# Patient Record
Sex: MALE | Race: WHITE
[De-identification: names, ages, dates, MRNs, and addresses within clinical notes are randomized per-mention and may not be internally consistent; named-entity substitution may affect disease eponyms.]

---

## 2017-04-21 ENCOUNTER — HOSPITAL ENCOUNTER (OUTPATIENT)
Dept: HOSPITAL 53 - M RAD | Age: 67
End: 2017-04-21
Attending: NURSE PRACTITIONER
Payer: MEDICARE

## 2017-04-21 DIAGNOSIS — K76.9: Primary | ICD-10-CM

## 2017-04-21 DIAGNOSIS — R10.11: ICD-10-CM

## 2017-04-21 NOTE — REP
Right upper quadrant sonography:

 

History:  Right upper quadrant pain, discomfort.

 

Comparison study:  February 6, 2014.

 

Findings:  Scanning through the right upper quadrant of the abdomen demonstrates

a borderline size liver measuring 17 cm in craniocaudal span in the midclavicular

line.  There is increased echogenicity diffusely in the liver with decreased

insonation consistent with diffuse fatty infiltration.  This is unchanged.

Common bile duct is normal measuring 0.4 cm in greatest diameter.  No gallstone,

polyp or mural thickening is seen.  The tail of the pancreas is partially

obscured by abdominal gas.  No pancreatic abnormality is seen.  No right renal

abnormality or ascites seen.  Right kidney measures 10.7 x 4.0 x 5.0 cm.

 

Impression:

 

Fatty infiltration of the liver.  Borderline liver size.  No other abnormality.

 

 

Signed by

Mehran Faria MD 04/21/2017 12:47 P

## 2017-11-08 ENCOUNTER — HOSPITAL ENCOUNTER (OUTPATIENT)
Dept: HOSPITAL 53 - M SFHCPLAZ | Age: 67
End: 2017-11-08
Attending: NURSE PRACTITIONER
Payer: MEDICARE

## 2017-11-08 DIAGNOSIS — Z00.00: Primary | ICD-10-CM

## 2017-11-08 DIAGNOSIS — I10: ICD-10-CM

## 2017-11-08 DIAGNOSIS — Z23: ICD-10-CM

## 2017-11-08 DIAGNOSIS — I05.9: ICD-10-CM

## 2017-11-08 DIAGNOSIS — D36.9: ICD-10-CM

## 2017-11-08 DIAGNOSIS — Z87.891: ICD-10-CM

## 2017-11-08 DIAGNOSIS — Z12.5: ICD-10-CM

## 2017-11-08 DIAGNOSIS — J30.2: ICD-10-CM

## 2017-11-08 DIAGNOSIS — K76.0: ICD-10-CM

## 2017-11-08 DIAGNOSIS — R74.8: ICD-10-CM

## 2017-11-08 DIAGNOSIS — R73.01: ICD-10-CM

## 2017-11-08 LAB
ALBUMIN SERPL BCG-MCNC: 3.8 GM/DL (ref 3.2–5.2)
ALBUMIN/GLOB SERPL: 1.19 {RATIO} (ref 1–1.93)
ALP SERPL-CCNC: 71 U/L (ref 45–117)
ALT SERPL W P-5'-P-CCNC: 37 U/L (ref 12–78)
ANION GAP SERPL CALC-SCNC: 10 MEQ/L (ref 8–16)
AST SERPL-CCNC: 16 U/L (ref 7–37)
BILIRUB SERPL-MCNC: 0.3 MG/DL (ref 0.2–1)
BUN SERPL-MCNC: 13 MG/DL (ref 7–18)
CALCIUM SERPL-MCNC: 9.2 MG/DL (ref 8.8–10.2)
CHLORIDE SERPL-SCNC: 106 MEQ/L (ref 98–107)
CHOLEST SERPL-MCNC: 205 MG/DL (ref ?–200)
CO2 SERPL-SCNC: 24 MEQ/L (ref 21–32)
CREAT SERPL-MCNC: 1.06 MG/DL (ref 0.7–1.3)
ERYTHROCYTE [DISTWIDTH] IN BLOOD BY AUTOMATED COUNT: 13.4 % (ref 11.5–14.5)
EST. AVERAGE GLUCOSE BLD GHB EST-MCNC: 126 MG/DL (ref 60–110)
GFR SERPL CREATININE-BSD FRML MDRD: > 60 ML/MIN/{1.73_M2} (ref 49–?)
GLUCOSE SERPL-MCNC: 109 MG/DL (ref 80–110)
MCH RBC QN AUTO: 29.2 PG (ref 27–33)
MCHC RBC AUTO-ENTMCNC: 33.6 G/DL (ref 32–36.5)
MCV RBC AUTO: 87 FL (ref 80–96)
NRBC BLD AUTO-RTO: 0 % (ref 0–0)
PLATELET # BLD AUTO: 259 10^3/UL (ref 150–450)
POTASSIUM SERPL-SCNC: 4.5 MEQ/L (ref 3.5–5.1)
PROT SERPL-MCNC: 7 GM/DL (ref 6.4–8.2)
SODIUM SERPL-SCNC: 140 MEQ/L (ref 136–145)
TRIGL SERPL-MCNC: 144 MG/DL (ref ?–150)
WBC # BLD AUTO: 6.3 10^3/UL (ref 4–10)

## 2017-11-08 PROCEDURE — 36415 COLL VENOUS BLD VENIPUNCTURE: CPT

## 2017-11-08 PROCEDURE — 85027 COMPLETE CBC AUTOMATED: CPT

## 2017-11-08 PROCEDURE — 80061 LIPID PANEL: CPT

## 2017-11-08 PROCEDURE — 83036 HEMOGLOBIN GLYCOSYLATED A1C: CPT

## 2017-11-08 PROCEDURE — 90662 IIV NO PRSV INCREASED AG IM: CPT

## 2017-11-08 PROCEDURE — 90732 PPSV23 VACC 2 YRS+ SUBQ/IM: CPT

## 2017-11-08 PROCEDURE — 80053 COMPREHEN METABOLIC PANEL: CPT

## 2018-11-30 ENCOUNTER — HOSPITAL ENCOUNTER (OUTPATIENT)
Dept: HOSPITAL 53 - M SFHCPLAZ | Age: 68
End: 2018-11-30
Attending: NURSE PRACTITIONER
Payer: MEDICARE

## 2018-11-30 DIAGNOSIS — Z12.5: ICD-10-CM

## 2018-11-30 DIAGNOSIS — R73.01: Primary | ICD-10-CM

## 2018-11-30 LAB
ALBUMIN/GLOBULIN RATIO: 1.09 (ref 1–1.93)
ALBUMIN: 3.6 GM/DL (ref 3.2–5.2)
ALKALINE PHOSPHATASE: 64 U/L (ref 45–117)
ALT SERPL W P-5'-P-CCNC: 36 U/L (ref 12–78)
ANION GAP: 9 MEQ/L (ref 8–16)
AST SERPL-CCNC: 17 U/L (ref 7–37)
BILIRUBIN,TOTAL: 0.4 MG/DL (ref 0.2–1)
BLOOD UREA NITROGEN: 14 MG/DL (ref 7–18)
CALCIUM LEVEL: 8.3 MG/DL (ref 8.8–10.2)
CARBON DIOXIDE LEVEL: 26 MEQ/L (ref 21–32)
CHLORIDE LEVEL: 108 MEQ/L (ref 98–107)
CHOLEST SERPL-MCNC: 181 MG/DL (ref ?–200)
CHOLESTEROL RISK RATIO: 3.42 (ref ?–5)
CREATININE FOR GFR: 1.09 MG/DL (ref 0.7–1.3)
EST. AVERAGE GLUCOSE BLD GHB EST-MCNC: 131 MG/DL (ref 60–110)
GFR SERPL CREATININE-BSD FRML MDRD: > 60 ML/MIN/{1.73_M2} (ref 49–?)
GLUCOSE, FASTING: 115 MG/DL (ref 70–100)
HDLC SERPL-MCNC: 53 MG/DL (ref 40–?)
HEMATOCRIT: 49 % (ref 42–52)
HEMOGLOBIN: 16.2 G/DL (ref 13.5–17.5)
LDL CHOLESTEROL: 103 MG/DL (ref ?–100)
MEAN CORPUSCULAR HEMOGLOBIN: 29.1 PG (ref 27–33)
MEAN CORPUSCULAR HGB CONC: 33.1 G/DL (ref 32–36.5)
MEAN CORPUSCULAR VOLUME: 88 FL (ref 80–96)
NONHDLC SERPL-MCNC: 128 MG/DL
NRBC BLD AUTO-RTO: 0 % (ref 0–0)
PLATELET COUNT, AUTOMATED: 240 10^3/UL (ref 150–450)
POTASSIUM SERUM: 4.6 MEQ/L (ref 3.5–5.1)
PSA SERPL-MCNC: 1.8 NG/ML (ref ?–4)
RED BLOOD COUNT: 5.57 10^6/UL (ref 4.3–6.1)
RED CELL DISTRIBUTION WIDTH: 13.5 % (ref 11.5–14.5)
SODIUM LEVEL: 143 MEQ/L (ref 136–145)
TOTAL PROTEIN: 6.9 GM/DL (ref 6.4–8.2)
TRIGLYCERIDES LEVEL: 124 MG/DL (ref ?–150)
WHITE BLOOD COUNT: 5.9 10^3/UL (ref 4–10)

## 2018-11-30 PROCEDURE — 80053 COMPREHEN METABOLIC PANEL: CPT

## 2019-11-18 ENCOUNTER — HOSPITAL ENCOUNTER (OUTPATIENT)
Dept: HOSPITAL 53 - M OPP | Age: 69
Discharge: HOME | End: 2019-11-18
Attending: INTERNAL MEDICINE
Payer: MEDICARE

## 2019-11-18 VITALS — WEIGHT: 241 LBS | BODY MASS INDEX: 35.7 KG/M2 | HEIGHT: 69 IN

## 2019-11-18 VITALS — SYSTOLIC BLOOD PRESSURE: 127 MMHG | DIASTOLIC BLOOD PRESSURE: 82 MMHG

## 2019-11-18 DIAGNOSIS — Z79.82: ICD-10-CM

## 2019-11-18 DIAGNOSIS — Z86.010: ICD-10-CM

## 2019-11-18 DIAGNOSIS — J44.9: ICD-10-CM

## 2019-11-18 DIAGNOSIS — I10: ICD-10-CM

## 2019-11-18 DIAGNOSIS — K64.8: ICD-10-CM

## 2019-11-18 DIAGNOSIS — D12.3: ICD-10-CM

## 2019-11-18 DIAGNOSIS — Z12.11: Primary | ICD-10-CM

## 2019-11-18 DIAGNOSIS — K57.30: ICD-10-CM

## 2019-11-18 DIAGNOSIS — Z79.899: ICD-10-CM

## 2019-11-18 NOTE — ROOR
________________________________________________________________________________

Patient Name: Cam Elkins        Procedure Date: 11/18/2019 11:34 AM

MRN: [id number]                          Account Number: [id number]

YOB: 1950              Age: 69

Room: [facility]                            Gender: Male

Note Status: Finalized                 

________________________________________________________________________________

 

Procedure:           Colonoscopy

Indications:         High risk colon cancer surveillance: Personal history of 

                     colonic polyps, Last colonoscopy: August 2014

Providers:           Demarco ROMAN MD

Referring MD:        Silva DOMINGUEZ NP

Requesting Provider: 

Medicines:           Monitored Anesthesia Care

Complications:       No immediate complications.

________________________________________________________________________________

Procedure:           Pre-Anesthesia Assessment:

                     - The heart rate, respiratory rate, oxygen saturations, 

                     blood pressure, adequacy of pulmonary ventilation, and 

                     response to care were monitored throughout the procedure.

                     The Colonoscope was introduced through the anus and 

                     advanced to the cecum, identified by appendiceal orifice 

                     and ileocecal valve. The colonoscopy was performed 

                     without difficulty. The patient tolerated the procedure 

                     well. The quality of the bowel preparation was good.

                                                                                

Findings:

     The perianal and digital rectal examinations were normal.

     A 5 mm polyp was found in the splenic flexure. The polyp was sessile. The 

     polyp was removed with a cold snare. Resection and retrieval were 

     complete.

     Multiple medium-mouthed diverticula were found in the sigmoid colon.

     Internal hemorrhoids were found during retroflexion. The hemorrhoids were 

     medium-sized.

     The exam was otherwise without abnormality on direct and retroflexion 

     views.

                                                                                

Impression:          - One 5 mm polyp at the splenic flexure, removed with a 

                     cold snare. Resected and retrieved.

                     - Diverticulosis in the sigmoid colon.

                     - Internal hemorrhoids.

                     - The examination was otherwise normal on direct and 

                     retroflexion views.

Recommendation:      - Repeat colonoscopy in 5 years for surveillance.

                                                                                

 

Demarco Roman MD

________________

Demarco ROMAN MD

11/18/2019 11:53:10 AM

Electronically signed by Demarco ROMAN MD

Number of Addenda: 0

 

Note Initiated On: 11/18/2019 11:34 AM

Estimated Blood Loss:

     Estimated blood loss: none.

## 2019-12-03 ENCOUNTER — HOSPITAL ENCOUNTER (OUTPATIENT)
Dept: HOSPITAL 53 - M SFHCPLAZ | Age: 69
End: 2019-12-03
Attending: NURSE PRACTITIONER
Payer: MEDICARE

## 2019-12-03 DIAGNOSIS — Z12.5: ICD-10-CM

## 2019-12-03 DIAGNOSIS — R73.01: Primary | ICD-10-CM

## 2019-12-03 DIAGNOSIS — K76.0: ICD-10-CM

## 2019-12-03 DIAGNOSIS — Z13.220: ICD-10-CM

## 2019-12-03 LAB
ALBUMIN SERPL BCG-MCNC: 3.9 GM/DL (ref 3.2–5.2)
ALT SERPL W P-5'-P-CCNC: 45 U/L (ref 12–78)
BILIRUB SERPL-MCNC: 0.5 MG/DL (ref 0.2–1)
BUN SERPL-MCNC: 11 MG/DL (ref 7–18)
CALCIUM SERPL-MCNC: 8.8 MG/DL (ref 8.8–10.2)
CHLORIDE SERPL-SCNC: 107 MEQ/L (ref 98–107)
CHOLEST SERPL-MCNC: 195 MG/DL (ref ?–200)
CHOLEST/HDLC SERPL: 3.36 {RATIO} (ref ?–5)
CO2 SERPL-SCNC: 26 MEQ/L (ref 21–32)
CREAT SERPL-MCNC: 1.05 MG/DL (ref 0.7–1.3)
EST. AVERAGE GLUCOSE BLD GHB EST-MCNC: 143 MG/DL (ref 60–110)
GFR SERPL CREATININE-BSD FRML MDRD: > 60 ML/MIN/{1.73_M2} (ref 49–?)
GLUCOSE SERPL-MCNC: 102 MG/DL (ref 70–100)
HDLC SERPL-MCNC: 58 MG/DL (ref 40–?)
LDLC SERPL CALC-MCNC: 108 MG/DL (ref ?–100)
NONHDLC SERPL-MCNC: 137 MG/DL
POTASSIUM SERPL-SCNC: 4.5 MEQ/L (ref 3.5–5.1)
PROT SERPL-MCNC: 7.6 GM/DL (ref 6.4–8.2)
SODIUM SERPL-SCNC: 139 MEQ/L (ref 136–145)
TRIGL SERPL-MCNC: 145 MG/DL (ref ?–150)

## 2019-12-03 PROCEDURE — 80053 COMPREHEN METABOLIC PANEL: CPT

## 2019-12-03 PROCEDURE — 36415 COLL VENOUS BLD VENIPUNCTURE: CPT

## 2019-12-03 PROCEDURE — 83036 HEMOGLOBIN GLYCOSYLATED A1C: CPT

## 2019-12-03 PROCEDURE — 80061 LIPID PANEL: CPT

## 2020-08-11 ENCOUNTER — HOSPITAL ENCOUNTER (OUTPATIENT)
Dept: HOSPITAL 53 - M SFHCPLAZ | Age: 70
End: 2020-08-11
Attending: NURSE PRACTITIONER
Payer: MEDICARE

## 2020-08-11 DIAGNOSIS — Z13.220: ICD-10-CM

## 2020-08-11 DIAGNOSIS — Z12.5: Primary | ICD-10-CM

## 2020-08-11 DIAGNOSIS — I10: ICD-10-CM

## 2020-08-11 DIAGNOSIS — R73.01: ICD-10-CM

## 2020-08-11 PROCEDURE — 83036 HEMOGLOBIN GLYCOSYLATED A1C: CPT

## 2020-08-11 PROCEDURE — 80061 LIPID PANEL: CPT

## 2020-08-11 PROCEDURE — 80053 COMPREHEN METABOLIC PANEL: CPT

## 2020-08-11 PROCEDURE — 36415 COLL VENOUS BLD VENIPUNCTURE: CPT

## 2020-09-05 ENCOUNTER — HOSPITAL ENCOUNTER (OUTPATIENT)
Dept: HOSPITAL 53 - M LABSMTC | Age: 70
End: 2020-09-05
Attending: FAMILY MEDICINE
Payer: MEDICARE

## 2020-09-05 DIAGNOSIS — Z20.828: Primary | ICD-10-CM

## 2020-09-25 LAB
ALBUMIN SERPL BCG-MCNC: 4 GM/DL (ref 3.2–5.2)
ALT SERPL W P-5'-P-CCNC: 42 U/L (ref 12–78)
BILIRUB SERPL-MCNC: 0.5 MG/DL (ref 0.2–1)
BUN SERPL-MCNC: 15 MG/DL (ref 7–18)
CALCIUM SERPL-MCNC: 8.5 MG/DL (ref 8.8–10.2)
CHLORIDE SERPL-SCNC: 107 MEQ/L (ref 98–107)
CHOLEST SERPL-MCNC: 179 MG/DL (ref ?–200)
CHOLEST/HDLC SERPL: 4.07 {RATIO} (ref ?–5)
CO2 SERPL-SCNC: 27 MEQ/L (ref 21–32)
CREAT SERPL-MCNC: 1.03 MG/DL (ref 0.7–1.3)
EST. AVERAGE GLUCOSE BLD GHB EST-MCNC: 134 MG/DL (ref 60–110)
GFR SERPL CREATININE-BSD FRML MDRD: > 60 ML/MIN/{1.73_M2} (ref 49–?)
GLUCOSE SERPL-MCNC: 97 MG/DL (ref 70–100)
HDLC SERPL-MCNC: 44 MG/DL (ref 40–?)
LDLC SERPL CALC-MCNC: 109 MG/DL (ref ?–100)
NONHDLC SERPL-MCNC: 135 MG/DL
POTASSIUM SERPL-SCNC: 4.1 MEQ/L (ref 3.5–5.1)
PROT SERPL-MCNC: 7.5 GM/DL (ref 6.4–8.2)
SODIUM SERPL-SCNC: 139 MEQ/L (ref 136–145)
TRIGL SERPL-MCNC: 132 MG/DL (ref ?–150)

## 2020-12-29 ENCOUNTER — HOSPITAL ENCOUNTER (OUTPATIENT)
Dept: HOSPITAL 53 - M SMT PRO | Age: 70
End: 2020-12-29
Attending: UROLOGY
Payer: MEDICARE

## 2020-12-29 DIAGNOSIS — R97.20: ICD-10-CM

## 2020-12-29 DIAGNOSIS — N40.2: ICD-10-CM

## 2020-12-29 DIAGNOSIS — C61: Primary | ICD-10-CM

## 2020-12-29 PROCEDURE — 55700: CPT

## 2020-12-29 PROCEDURE — 76942 ECHO GUIDE FOR BIOPSY: CPT

## 2020-12-29 PROCEDURE — 76872 US TRANSRECTAL: CPT

## 2020-12-29 NOTE — REPPI
INDICATION:

ELEVATED PSA  Elevated prostate specific antigen levels.



COMPARISON:

None.



TECHNIQUE:

Transrectal ultrasound examination.



FINDINGS:

Examination demonstrates enlarged heterogeneous gland with multiple scattered

calcifications with 6 x 4 mm hypoechoic nodule along the right posterior gland and 8 x

5 mm nodule at the anterior left base.  Gland measures 4.2 x 4.5 x 3.7 cm (36 ml).



Ultrasound-guided Biopsy performed by Dr. Alexis included 12 passes with 18 gauge

needle after the administration of local anesthetic.  No obvious complications during

examination as reported by sonographer.



IMPRESSION:

1. Heterogeneous prostate gland with 2 hypodense nodules as above.

2. Status post biopsy.





<Electronically signed by Sebastien Beasley > 12/29/20 [id number]

## 2020-12-31 ENCOUNTER — HOSPITAL ENCOUNTER (EMERGENCY)
Dept: HOSPITAL 53 - M ED | Age: 70
LOS: 1 days | Discharge: HOME | End: 2021-01-01
Payer: MEDICARE

## 2020-12-31 VITALS — BODY MASS INDEX: 37.13 KG/M2 | WEIGHT: 250.67 LBS | HEIGHT: 69 IN

## 2020-12-31 DIAGNOSIS — Z79.899: ICD-10-CM

## 2020-12-31 DIAGNOSIS — J44.9: ICD-10-CM

## 2020-12-31 DIAGNOSIS — I10: ICD-10-CM

## 2020-12-31 DIAGNOSIS — N41.0: Primary | ICD-10-CM

## 2020-12-31 DIAGNOSIS — Z79.82: ICD-10-CM

## 2020-12-31 DIAGNOSIS — N99.820: ICD-10-CM

## 2020-12-31 LAB
ALBUMIN SERPL BCG-MCNC: 3.8 GM/DL (ref 3.2–5.2)
ALT SERPL W P-5'-P-CCNC: 36 U/L (ref 12–78)
BASOPHILS # BLD AUTO: 0 10^3/UL (ref 0–0.2)
BASOPHILS NFR BLD AUTO: 0.3 % (ref 0–1)
BILIRUB CONJ SERPL-MCNC: 0.2 MG/DL (ref 0–0.2)
BILIRUB SERPL-MCNC: 0.8 MG/DL (ref 0.2–1)
BUN SERPL-MCNC: 14 MG/DL (ref 7–18)
CALCIUM SERPL-MCNC: 9.6 MG/DL (ref 8.8–10.2)
CHLORIDE SERPL-SCNC: 105 MEQ/L (ref 98–107)
CO2 SERPL-SCNC: 26 MEQ/L (ref 21–32)
CREAT SERPL-MCNC: 1.27 MG/DL (ref 0.7–1.3)
EOSINOPHIL # BLD AUTO: 0 10^3/UL (ref 0–0.5)
EOSINOPHIL NFR BLD AUTO: 0 % (ref 0–3)
GFR SERPL CREATININE-BSD FRML MDRD: 59.7 ML/MIN/{1.73_M2} (ref 42–?)
GLUCOSE SERPL-MCNC: 166 MG/DL (ref 70–100)
HCT VFR BLD AUTO: 48.4 % (ref 42–52)
HGB BLD-MCNC: 15.7 G/DL (ref 13.5–17.5)
LIPASE SERPL-CCNC: 94 U/L (ref 73–393)
LYMPHOCYTES # BLD AUTO: 1.2 10^3/UL (ref 1.5–5)
LYMPHOCYTES NFR BLD AUTO: 9.9 % (ref 24–44)
MCH RBC QN AUTO: 28.5 PG (ref 27–33)
MCHC RBC AUTO-ENTMCNC: 32.4 G/DL (ref 32–36.5)
MCV RBC AUTO: 87.8 FL (ref 80–96)
MONOCYTES # BLD AUTO: 0.8 10^3/UL (ref 0–0.8)
MONOCYTES NFR BLD AUTO: 6.6 % (ref 0–5)
NEUTROPHILS # BLD AUTO: 10.2 10^3/UL (ref 1.5–8.5)
NEUTROPHILS NFR BLD AUTO: 82.7 % (ref 36–66)
PLATELET # BLD AUTO: 225 10^3/UL (ref 150–450)
POTASSIUM SERPL-SCNC: 3.9 MEQ/L (ref 3.5–5.1)
PROT SERPL-MCNC: 7.8 GM/DL (ref 6.4–8.2)
RBC # BLD AUTO: 5.51 10^6/UL (ref 4.3–6.1)
SODIUM SERPL-SCNC: 139 MEQ/L (ref 136–145)
WBC # BLD AUTO: 12.4 10^3/UL (ref 4–10)

## 2020-12-31 PROCEDURE — 99284 EMERGENCY DEPT VISIT MOD MDM: CPT

## 2020-12-31 PROCEDURE — 80048 BASIC METABOLIC PNL TOTAL CA: CPT

## 2020-12-31 PROCEDURE — 83690 ASSAY OF LIPASE: CPT

## 2020-12-31 PROCEDURE — 81001 URINALYSIS AUTO W/SCOPE: CPT

## 2020-12-31 PROCEDURE — 85025 COMPLETE CBC W/AUTO DIFF WBC: CPT

## 2020-12-31 PROCEDURE — 96365 THER/PROPH/DIAG IV INF INIT: CPT

## 2020-12-31 PROCEDURE — 80076 HEPATIC FUNCTION PANEL: CPT

## 2020-12-31 PROCEDURE — 83605 ASSAY OF LACTIC ACID: CPT

## 2020-12-31 PROCEDURE — 87040 BLOOD CULTURE FOR BACTERIA: CPT

## 2021-01-01 VITALS — DIASTOLIC BLOOD PRESSURE: 79 MMHG | SYSTOLIC BLOOD PRESSURE: 148 MMHG

## 2021-01-11 ENCOUNTER — HOSPITAL ENCOUNTER (OUTPATIENT)
Dept: HOSPITAL 53 - M RAD | Age: 71
End: 2021-01-11
Attending: UROLOGY
Payer: MEDICARE

## 2021-01-11 DIAGNOSIS — K76.0: Primary | ICD-10-CM

## 2021-01-11 DIAGNOSIS — C61: ICD-10-CM

## 2021-01-11 DIAGNOSIS — N40.0: ICD-10-CM

## 2021-01-11 PROCEDURE — 74177 CT ABD & PELVIS W/CONTRAST: CPT

## 2021-01-11 NOTE — REP
INDICATION:

PROSTATE CA.



COMPARISON:

None



TECHNIQUE:

Axial contrast-enhanced images from the lung bases to the pubic symphysis using 100 cc

Isovue 370 intravenous contrast material.  Delayed images of the abdomen along with

coronal and sagittal reformations obtained.



This CT examination was performed using the following dose reduction techniques:

Automated exposure control, adjustment of mA and/or kv according to the patient's

size, and the use of iterative reconstruction technique.



FINDINGS:

Lung bases are clear.



Liver demonstrates diffuse fatty infiltration without focal hepatic lesion.  Spleen,

pancreas, gallbladder, bilateral adrenal glands and kidneys are normal.



The enteric system including stomach, small, and large bowel appears normal.  No

evidence for obstruction or acute inflammatory process.  Normal terminal ileum and

appendix are identified in the right lower quadrant.



Pelvis demonstrates heterogeneous mildly enlarged prostate gland measuring roughly 4

cm maximal diameter with coarse parenchymal calcifications noted.  No periprosthetic

inflammatory changes or obvious pelvic adenopathy.  Bladder is unremarkable.



No ascites.  No free air.  No intraperitoneal or retroperitoneal adenopathy.

Atherosclerotic changes to the aorta and vasculature noted without aneurysm or

dissection.  Musculoskeletal structures demonstrate generalized scattered degenerative

changes without definite focal osseous abnormality to suggest metastatic disease.

Asymmetric sclerotic changes in the right iliac along the sacroiliac joint is

nonspecific (series 201; image 120).



IMPRESSION:

1.  Hepatosteatosis.

2.  Prostatomegaly without obvious periprostatic inflammatory changes or pelvic

adenopathy.

3.  Asymmetric area of sclerosis in the right iliac bone adjacent to the sacroiliac

joint is nonspecific by CT evaluation.  No further obvious sclerotic osseous lesions

are identified.  Bone scan should be considered for further investigation.





<Electronically signed by Sebastien Beasley > 01/11/21 0805

## 2021-01-25 ENCOUNTER — HOSPITAL ENCOUNTER (OUTPATIENT)
Dept: HOSPITAL 53 - M RAD | Age: 71
End: 2021-01-25
Attending: UROLOGY
Payer: MEDICARE

## 2021-01-25 DIAGNOSIS — C61: Primary | ICD-10-CM

## 2021-01-25 PROCEDURE — 78306 BONE IMAGING WHOLE BODY: CPT

## 2021-01-25 NOTE — REP
INDICATION:

PROSTATE CARCER.



COMPARISON:

Comparison is made with CT study of the abdomen and pelvis January 11, 2021..



TECHNIQUE/RADIOTRACER AND DOSE:

22.0 mCi of Technetium-99m MDP was injected and standard whole-body bone scanning is

acquired.



FINDINGS:

There is a normal distribution of skeletal tracer with uptake in bilateral kidneys and

in the urinary bladder.  There is no evidence to suggest skeletal metastatic disease.

There is a focus of increased uptake in the right inferior pubic ramus of uncertain

significance.  There is mild degenerative uptake in the lower thoracic spine.  There

is asymmetric uptake in the iliac bones adjacent to the SI joints, right more so than

left.  This corresponds to the area of sclerosis seen on recent CT study.  This is

most likely chronic degenerative change.  No other abnormal skeletal radiotracer

uptake is seen.



IMPRESSION:

Asymmetric uptake in the iliac bones adjacent to the SI joints, right greater than

left as noted above.  There is also a focus of increased uptake in the right inferior

pubic ramus.  This is nonspecific.  Otherwise no suspicious abnormality..





<Electronically signed by Julio Faria > 01/25/21 [id number]

## 2021-02-03 ENCOUNTER — HOSPITAL ENCOUNTER (OUTPATIENT)
Dept: HOSPITAL 53 - M LABSMTC | Age: 71
End: 2021-02-03
Attending: ANESTHESIOLOGY
Payer: MEDICARE

## 2021-02-03 DIAGNOSIS — Z01.812: Primary | ICD-10-CM

## 2021-02-03 DIAGNOSIS — Z20.822: ICD-10-CM

## 2021-02-08 ENCOUNTER — HOSPITAL ENCOUNTER (OUTPATIENT)
Dept: HOSPITAL 53 - M SDC | Age: 71
Discharge: HOME | End: 2021-02-08
Attending: OPHTHALMOLOGY
Payer: MEDICARE

## 2021-02-08 VITALS — HEIGHT: 69 IN | BODY MASS INDEX: 36.7 KG/M2 | WEIGHT: 247.8 LBS

## 2021-02-08 VITALS — SYSTOLIC BLOOD PRESSURE: 171 MMHG | DIASTOLIC BLOOD PRESSURE: 88 MMHG

## 2021-02-08 DIAGNOSIS — I10: ICD-10-CM

## 2021-02-08 DIAGNOSIS — Z79.899: ICD-10-CM

## 2021-02-08 DIAGNOSIS — H25.11: Primary | ICD-10-CM

## 2021-02-08 DIAGNOSIS — Z79.82: ICD-10-CM

## 2021-02-08 DIAGNOSIS — Z87.891: ICD-10-CM

## 2021-02-08 DIAGNOSIS — Z85.46: ICD-10-CM

## 2021-02-08 PROCEDURE — 66984 XCAPSL CTRC RMVL W/O ECP: CPT

## 2021-02-10 NOTE — RO
OPERATIVE NOTE



DATE OF OPERATION: 02/08/2021



PREOPERATIVE DIAGNOSIS: 

1. Visually significant nuclear sclerotic cataract, right eye.



POSTOPERATIVE DIAGNOSIS:

1. Visually significant nuclear sclerotic cataract, right eye.



PROCEDURE:

1. Cataract extraction with use of phacoemulsification, and placement of

intraocular lens, AU00T0, D 20.5, right eye.



SURGEON:  Joce Hodge DO



ANESTHESIA:  Local (Omidria with MAC)

COMPLICATIONS:  None

POSTOPERATIVE CONDITION:  Stable

INDICATIONS FOR SURGERY:  

1.  Blurred vision affecting patient's activities of daily living.



DESCRIPTION OF PROCEDURE:

The patient was seen in the preoperative area and properly identified. The

correct operative eye was identified and marked. The patient received topical

anesthetic, antibiotics, and topical dilating drops. The patient was then

transferred to the operating room.



The correct side was re-identified and a time-out was performed. The eye was

prepped and draped in a sterile fashion. The eyelids were isolated with

Tegaderm tape and the lids were held open with an adjustable speculum.

A 1.0mm paracentesis incision was made. Omidria was then injected into the

anterior chamber. Viscoelastic was then injected into the anterior chamber

through the paracentesis. Using a 2.4mm sharp-tipped keratome, the anterior

chamber was entered via a temporal clear cornea incision.

A continuous curvilinear capsulorrhexis was created with Utrata forceps.

Hydrodissection was performed with BSS on a blunt cannula until the nucleus was

able to rotate freely. The crystalline lens was phacoemulsified and aspirated.

Irrigation/aspiration was used to remove the cortical material

Cohesive viscoelastic was placed into the capsular bag to deepen it. The

implant was placed into the capsular bag and allowed to unfold. Placement was

confirmed by visualizing the anterior capsulorrhexis. Irrigation/aspiration was

used to remove the viscoelastic.

The clear corneal incision was hydrated with BSS on a blunt cannula. The lens

was well positioned. Intracameral antibiotic was injected into the anterior

chamber. The incisions were then tested for leaks and found to be negative. The

eye was then palpated for appropriate pressure and adjusted accordingly with

BSS.

The eyelid speculum was then carefully removed. A shield was placed over the

eye.

The patient tolerated the procedure well and was discharge to the recovery unit

in a stable condition.

## 2021-03-01 ENCOUNTER — HOSPITAL ENCOUNTER (OUTPATIENT)
Dept: HOSPITAL 53 - M SFHCPLAZ | Age: 71
End: 2021-03-01
Attending: INTERNAL MEDICINE
Payer: MEDICARE

## 2021-03-01 ENCOUNTER — HOSPITAL ENCOUNTER (OUTPATIENT)
Dept: HOSPITAL 53 - M PLAIMG | Age: 71
End: 2021-03-01
Attending: INTERNAL MEDICINE
Payer: MEDICARE

## 2021-03-01 DIAGNOSIS — I10: ICD-10-CM

## 2021-03-01 DIAGNOSIS — Z01.818: Primary | ICD-10-CM

## 2021-03-01 DIAGNOSIS — Z79.899: ICD-10-CM

## 2021-03-01 DIAGNOSIS — C61: ICD-10-CM

## 2021-03-01 LAB
APTT BLD: 31.8 SECONDS (ref 24.2–38.5)
BASOPHILS # BLD AUTO: 0.1 10^3/UL (ref 0–0.2)
BASOPHILS NFR BLD AUTO: 1.1 % (ref 0–1)
BUN SERPL-MCNC: 16 MG/DL (ref 7–18)
CALCIUM SERPL-MCNC: 9 MG/DL (ref 8.8–10.2)
CHLORIDE SERPL-SCNC: 105 MEQ/L (ref 98–107)
CO2 SERPL-SCNC: 26 MEQ/L (ref 21–32)
CREAT SERPL-MCNC: 1.1 MG/DL (ref 0.7–1.3)
EOSINOPHIL # BLD AUTO: 0 10^3/UL (ref 0–0.5)
EOSINOPHIL NFR BLD AUTO: 0 % (ref 0–3)
GFR SERPL CREATININE-BSD FRML MDRD: > 60 ML/MIN/{1.73_M2} (ref 42–?)
GLUCOSE SERPL-MCNC: 150 MG/DL (ref 70–100)
HCT VFR BLD AUTO: 47.1 % (ref 42–52)
HGB BLD-MCNC: 15.3 G/DL (ref 13.5–17.5)
INR PPP: 0.99
LYMPHOCYTES # BLD AUTO: 2.3 10^3/UL (ref 1.5–5)
LYMPHOCYTES NFR BLD AUTO: 35.4 % (ref 24–44)
MCH RBC QN AUTO: 28.4 PG (ref 27–33)
MCHC RBC AUTO-ENTMCNC: 32.5 G/DL (ref 32–36.5)
MCV RBC AUTO: 87.5 FL (ref 80–96)
MONOCYTES # BLD AUTO: 0.6 10^3/UL (ref 0–0.8)
MONOCYTES NFR BLD AUTO: 8.9 % (ref 2–8)
NEUTROPHILS # BLD AUTO: 3.5 10^3/UL (ref 1.5–8.5)
NEUTROPHILS NFR BLD AUTO: 54.3 % (ref 36–66)
PLATELET # BLD AUTO: 251 10^3/UL (ref 150–450)
POTASSIUM SERPL-SCNC: 4.5 MEQ/L (ref 3.5–5.1)
PROTHROMBIN TIME: 13.3 SECONDS (ref 12.5–14.3)
RBC # BLD AUTO: 5.38 10^6/UL (ref 4.3–6.1)
SODIUM SERPL-SCNC: 138 MEQ/L (ref 136–145)
WBC # BLD AUTO: 6.4 10^3/UL (ref 4–10)

## 2021-03-01 NOTE — REPPI
INDICATION:

Z01.818 PRE OP C61 PROSTATE CANCER I10 HYPERTENSION



COMPARISON:

None.



TECHNIQUE:

PA and lateral.



FINDINGS:

The mediastinum and cardiac silhouette are normal.  The lung fields are clear and

without acute consolidation, effusion, or pneumothorax.  The skeletal structures are

intact and normal.



IMPRESSION:

No acute cardiopulmonary process.





<Electronically signed by Sebastien Beasley > 03/01/21 1100

## 2021-03-04 ENCOUNTER — HOSPITAL ENCOUNTER (OUTPATIENT)
Dept: HOSPITAL 53 - M LABSMTC | Age: 71
End: 2021-03-04
Attending: ANESTHESIOLOGY
Payer: MEDICARE

## 2021-03-04 DIAGNOSIS — Z20.822: ICD-10-CM

## 2021-03-04 DIAGNOSIS — Z01.812: Primary | ICD-10-CM

## 2021-03-09 ENCOUNTER — HOSPITAL ENCOUNTER (INPATIENT)
Dept: HOSPITAL 53 - M OR | Age: 71
LOS: 2 days | Discharge: HOME | DRG: 708 | End: 2021-03-11
Attending: UROLOGY | Admitting: UROLOGY
Payer: MEDICARE

## 2021-03-09 VITALS — DIASTOLIC BLOOD PRESSURE: 73 MMHG | SYSTOLIC BLOOD PRESSURE: 116 MMHG

## 2021-03-09 VITALS — DIASTOLIC BLOOD PRESSURE: 80 MMHG | SYSTOLIC BLOOD PRESSURE: 133 MMHG

## 2021-03-09 VITALS — SYSTOLIC BLOOD PRESSURE: 130 MMHG | DIASTOLIC BLOOD PRESSURE: 77 MMHG

## 2021-03-09 VITALS — SYSTOLIC BLOOD PRESSURE: 113 MMHG | DIASTOLIC BLOOD PRESSURE: 74 MMHG

## 2021-03-09 VITALS — BODY MASS INDEX: 37.03 KG/M2 | HEIGHT: 69 IN | WEIGHT: 250 LBS

## 2021-03-09 DIAGNOSIS — C61: Primary | ICD-10-CM

## 2021-03-09 DIAGNOSIS — I10: ICD-10-CM

## 2021-03-09 LAB
BUN SERPL-MCNC: 18 MG/DL (ref 7–18)
CALCIUM SERPL-MCNC: 8.8 MG/DL (ref 8.8–10.2)
CHLORIDE SERPL-SCNC: 107 MEQ/L (ref 98–107)
CO2 SERPL-SCNC: 23 MEQ/L (ref 21–32)
CREAT SERPL-MCNC: 1.85 MG/DL (ref 0.7–1.3)
GFR SERPL CREATININE-BSD FRML MDRD: 38.7 ML/MIN/{1.73_M2} (ref 42–?)
GLUCOSE SERPL-MCNC: 205 MG/DL (ref 70–100)
HCT VFR BLD AUTO: 45.2 % (ref 42–52)
HGB BLD-MCNC: 14.2 G/DL (ref 13.5–17.5)
MCH RBC QN AUTO: 28.2 PG (ref 27–33)
MCHC RBC AUTO-ENTMCNC: 31.4 G/DL (ref 32–36.5)
MCV RBC AUTO: 89.9 FL (ref 80–96)
PLATELET # BLD AUTO: 261 10^3/UL (ref 150–450)
POTASSIUM SERPL-SCNC: 5.2 MEQ/L (ref 3.5–5.1)
RBC # BLD AUTO: 5.03 10^6/UL (ref 4.3–6.1)
SODIUM SERPL-SCNC: 137 MEQ/L (ref 136–145)
WBC # BLD AUTO: 12.1 10^3/UL (ref 4–10)

## 2021-03-09 PROCEDURE — 0VT04ZZ RESECTION OF PROSTATE, PERCUTANEOUS ENDOSCOPIC APPROACH: ICD-10-PCS | Performed by: UROLOGY

## 2021-03-09 PROCEDURE — 07TC4ZZ RESECTION OF PELVIS LYMPHATIC, PERCUTANEOUS ENDOSCOPIC APPROACH: ICD-10-PCS | Performed by: UROLOGY

## 2021-03-09 RX ADMIN — SODIUM CHLORIDE SCH MLS/HR: 9 INJECTION, SOLUTION INTRAVENOUS at 21:43

## 2021-03-09 RX ADMIN — SODIUM CHLORIDE SCH UNITS: 4.5 INJECTION, SOLUTION INTRAVENOUS at 09:00

## 2021-03-09 RX ADMIN — CEFAZOLIN SODIUM SCH MLS/HR: 1 INJECTION, POWDER, FOR SOLUTION INTRAMUSCULAR; INTRAVENOUS at 18:05

## 2021-03-09 RX ADMIN — SODIUM CHLORIDE SCH UNITS: 4.5 INJECTION, SOLUTION INTRAVENOUS at 22:17

## 2021-03-09 RX ADMIN — DOCUSATE SODIUM SCH MG: 100 CAPSULE, LIQUID FILLED ORAL at 21:42

## 2021-03-09 RX ADMIN — CEFAZOLIN SODIUM SCH MLS/HR: 1 INJECTION, POWDER, FOR SOLUTION INTRAMUSCULAR; INTRAVENOUS at 21:43

## 2021-03-09 RX ADMIN — SODIUM CHLORIDE SCH UNITS: 4.5 INJECTION, SOLUTION INTRAVENOUS at 17:00

## 2021-03-09 NOTE — ROOPDOC
[facility] Report Of Operation


Report of Operation


DATE OF PROCEDURE: 3/9/21





PREPROCEDURE DIAGNOSIS:   Prostate cancer.


 


POSTPROCEDURE DIAGNOSIS:    Prostate cancer.


 


PROCEDURE:  Robotic-assisted laparoscopic radical prostatectomy with bilateral 

pelvic lymph node dissection.


 


SURGEON:  Ramsey Lomax MD


 


ASSISTANT:  Ailyn Mcfadden NP


 


ANESTHESIA:  General.


 


OPERATIVE INDICATIONS: This is a 70 year old male with clinical T2b [place] 4+4 

prostate cancer. After a discussion of the options for treatment, he elected to 

undergo the above procedure.


  


DESCRIPTION OF PROCEDURE: The patient was brought to the operating room and 

general anesthesia was induced. Prophylactic antibiotics were infused. He was 

then placed in the dorsal lithotomy position and prepped and draped in the usual

sterile fashion. At this point, a Christianson catheter was inserted into the bladder 

and the balloon was filled with 10 mL of sterile water.  We then made a midline 

incision just above the umbilicus for an 8 mm port.  A Veress needle was 

utilized to achieve pneumoperitoneum.  Next, an 8 mm port was inserted into the 

incision and subsequently a camera was inserted.  There were no injuries from 

the Veress needle or initial trocar placement.


 


At this point, we placed the remaining ports, including a 12 mm assistant port 

and then three robotic ports in the usual configuration.  Once all the ports 

were placed, the robot was docked.   Lysis of adhesions between the sigmoid 

colon and abdominal wall was then performed.  The bladder was then released from

the anterior abdominal wall using electrocautery.  Once the bladder was dropped,

the fat overlying the prostate was cleared using electrocautery.  The 

superficial dorsal vein was controlled with electrocautery.  The endopelvic 

fascia was opened on both sides and the dorsal venous complex was cleared.  

Next, a #0 Vicryl figure-of-eight stitch was placed around the dorsal venous 

complex.  Once that was done, the bladder was opened and dissected away from the

prostate.  At this point, the prostate was lifted up.  The vasa deferentia were 

identified in the midline.  They were then ligated and transected.  The seminal 

vesicles were also dissected off bilaterally.  The rectum was safely mobilized 

away from the prostate.  Bilateral prostatic pedicles were taken using the 

Harmonic scalpel.  The pedicles were carried towards the apex.  After taking 

care of the pedicles and mobilizing the rectum off the prostate below, the 

prostate was only connected by the urethra.  At this point, the dorsal venous 

complex was transected with electrocautery.  The urethra was then opened and the

catheter was withdrawn and the posterior urethra was transected, thus freeing 

the prostate.  At this point, we checked for hemostasis and it did appear very 

good.


 


Next, we performed bilateral pelvic lymph node dissection.  This was done in a 

standard fashion.  The limits of dissection were the iliac vein proximally, the 

obturator nerve distally, the pelvic sidewall laterally, and the bladder 

medially. All lymphatic tissue within these limits was removed.  I performed the

same procedure on both the right and left sides. Hemostasis was then obtained 

with bipolar electrocautery. The lymphatic packets were then placed in separate 

Endo Catch bags for future retrieval.


 


Once hemostasis was confirmed, I then moved on to perform the vesicourethral 

anastomosis. This was performed with a Quill stitch in a running fashion. Once 

this was done, the final #20-Syriac Christianson catheter was placed. The balloon was 

filled with 15 mL of sterile water. Upon completion of the vesicourethral 

anastomosis, it was tested by filling the bladder with sterile saline water. The

anastomosis appeared to be watertight. At this point, the prostate and seminal 

vesicles were placed in an Endo Catch bag for future retrieval. The robot was 

then undocked.


 


A Skylar fascial closure device was utilized to place a #0 Vicryl 

suture between the fascia of the 12 mm assistant port. At this point, a Alexandre-

Montiel drain was brought in through the left robotic port skin site and the drain

was positioned anterior to the bladder. The drain was secured to the skin with 

#2-0 Ethilon suture.  Next, all the remaining ports were removed and there did 

not appear to be any bleeding from any of the port sites. The prostate, as well 

as the lymphatic packets were then extracted from the camera port site after the

skin was extended.  The fascia in this incision was then closed with a running 

#0 Vicryl stitch.  The previously placed #0 Vicryl free ties through the as

sistant port were then tied down and all incisions were irrigated. Last, all of 

the incisions were closed with running subcuticular #4-0 Monocryl sutures.  

Local anesthesia was applied.  Dermabond was then applied to the incisions.  

This marked the conclusion of the procedure.  The patient was then taken out of 

the dorsal lithotomy position, awakened from anesthesia and transported to the 

recovery room in stable condition.


 


ESTIMATED BLOOD LOSS:  200 mL.


 


COMPLICATIONS: None.


 


SPECIMENS:  Prostate and seminal vesicles, right pelvic lymph nodes, left pelvic

lymph nodes.


 


PLAN: The patient will be admitted to the hospital postoperatively, and he will 

likely be discharged home within the next 1-2 days.











RAMSEY LOMAX MD            Mar 9, 2021 20:15

## 2021-03-10 VITALS — SYSTOLIC BLOOD PRESSURE: 126 MMHG | DIASTOLIC BLOOD PRESSURE: 76 MMHG

## 2021-03-10 VITALS — DIASTOLIC BLOOD PRESSURE: 69 MMHG | SYSTOLIC BLOOD PRESSURE: 138 MMHG

## 2021-03-10 VITALS — DIASTOLIC BLOOD PRESSURE: 78 MMHG | SYSTOLIC BLOOD PRESSURE: 123 MMHG

## 2021-03-10 VITALS — DIASTOLIC BLOOD PRESSURE: 60 MMHG | SYSTOLIC BLOOD PRESSURE: 134 MMHG

## 2021-03-10 VITALS — SYSTOLIC BLOOD PRESSURE: 122 MMHG | DIASTOLIC BLOOD PRESSURE: 73 MMHG

## 2021-03-10 VITALS — DIASTOLIC BLOOD PRESSURE: 74 MMHG | SYSTOLIC BLOOD PRESSURE: 114 MMHG

## 2021-03-10 VITALS — DIASTOLIC BLOOD PRESSURE: 75 MMHG | SYSTOLIC BLOOD PRESSURE: 136 MMHG

## 2021-03-10 LAB
BUN SERPL-MCNC: 18 MG/DL (ref 7–18)
CALCIUM SERPL-MCNC: 8.3 MG/DL (ref 8.8–10.2)
CHLORIDE SERPL-SCNC: 103 MEQ/L (ref 98–107)
CO2 SERPL-SCNC: 26 MEQ/L (ref 21–32)
CREAT SERPL-MCNC: 1.45 MG/DL (ref 0.7–1.3)
GFR SERPL CREATININE-BSD FRML MDRD: 51.2 ML/MIN/{1.73_M2} (ref 42–?)
GLUCOSE SERPL-MCNC: 200 MG/DL (ref 70–100)
HCT VFR BLD AUTO: 40.7 % (ref 42–52)
HGB BLD-MCNC: 13.1 G/DL (ref 13.5–17.5)
MCH RBC QN AUTO: 28.6 PG (ref 27–33)
MCHC RBC AUTO-ENTMCNC: 32.2 G/DL (ref 32–36.5)
MCV RBC AUTO: 88.9 FL (ref 80–96)
PLATELET # BLD AUTO: 221 10^3/UL (ref 150–450)
POTASSIUM SERPL-SCNC: 4.4 MEQ/L (ref 3.5–5.1)
RBC # BLD AUTO: 4.58 10^6/UL (ref 4.3–6.1)
SODIUM SERPL-SCNC: 135 MEQ/L (ref 136–145)
WBC # BLD AUTO: 9.1 10^3/UL (ref 4–10)

## 2021-03-10 RX ADMIN — SODIUM CHLORIDE SCH UNITS: 4.5 INJECTION, SOLUTION INTRAVENOUS at 13:45

## 2021-03-10 RX ADMIN — CEFAZOLIN SODIUM SCH MLS/HR: 1 INJECTION, POWDER, FOR SOLUTION INTRAMUSCULAR; INTRAVENOUS at 05:45

## 2021-03-10 RX ADMIN — SODIUM CHLORIDE SCH UNITS: 4.5 INJECTION, SOLUTION INTRAVENOUS at 05:45

## 2021-03-10 RX ADMIN — DOCUSATE SODIUM SCH MG: 100 CAPSULE, LIQUID FILLED ORAL at 20:48

## 2021-03-10 RX ADMIN — DOCUSATE SODIUM SCH MG: 100 CAPSULE, LIQUID FILLED ORAL at 08:09

## 2021-03-10 RX ADMIN — SODIUM CHLORIDE SCH MLS/HR: 9 INJECTION, SOLUTION INTRAVENOUS at 05:45

## 2021-03-10 RX ADMIN — SODIUM CHLORIDE SCH UNITS: 4.5 INJECTION, SOLUTION INTRAVENOUS at 20:49

## 2021-03-10 RX ADMIN — METOPROLOL SUCCINATE SCH MG: 50 TABLET, EXTENDED RELEASE ORAL at 08:11

## 2021-03-10 RX ADMIN — LOSARTAN POTASSIUM SCH MG: 50 TABLET, FILM COATED ORAL at 08:11

## 2021-03-11 VITALS — DIASTOLIC BLOOD PRESSURE: 77 MMHG | SYSTOLIC BLOOD PRESSURE: 131 MMHG

## 2021-03-11 VITALS — SYSTOLIC BLOOD PRESSURE: 131 MMHG | DIASTOLIC BLOOD PRESSURE: 77 MMHG

## 2021-03-11 LAB
BUN SERPL-MCNC: 15 MG/DL (ref 7–18)
CALCIUM SERPL-MCNC: 8.2 MG/DL (ref 8.8–10.2)
CHLORIDE SERPL-SCNC: 104 MEQ/L (ref 98–107)
CO2 SERPL-SCNC: 28 MEQ/L (ref 21–32)
CREAT SERPL-MCNC: 1.07 MG/DL (ref 0.7–1.3)
GFR SERPL CREATININE-BSD FRML MDRD: > 60 ML/MIN/{1.73_M2} (ref 42–?)
GLUCOSE SERPL-MCNC: 165 MG/DL (ref 70–100)
HCT VFR BLD AUTO: 38.8 % (ref 42–52)
HGB BLD-MCNC: 12.9 G/DL (ref 13.5–17.5)
MCH RBC QN AUTO: 30.2 PG (ref 27–33)
MCHC RBC AUTO-ENTMCNC: 33.2 G/DL (ref 32–36.5)
MCV RBC AUTO: 90.9 FL (ref 80–96)
PLATELET # BLD AUTO: 181 10^3/UL (ref 150–450)
POTASSIUM SERPL-SCNC: 4.1 MEQ/L (ref 3.5–5.1)
RBC # BLD AUTO: 4.27 10^6/UL (ref 4.3–6.1)
SODIUM SERPL-SCNC: 138 MEQ/L (ref 136–145)
WBC # BLD AUTO: 8.1 10^3/UL (ref 4–10)

## 2021-03-11 RX ADMIN — SODIUM CHLORIDE SCH UNITS: 4.5 INJECTION, SOLUTION INTRAVENOUS at 14:13

## 2021-03-11 RX ADMIN — DOCUSATE SODIUM SCH MG: 100 CAPSULE, LIQUID FILLED ORAL at 08:29

## 2021-03-11 RX ADMIN — METOPROLOL SUCCINATE SCH MG: 50 TABLET, EXTENDED RELEASE ORAL at 08:28

## 2021-03-11 RX ADMIN — SODIUM CHLORIDE SCH UNITS: 4.5 INJECTION, SOLUTION INTRAVENOUS at 05:29

## 2021-03-11 RX ADMIN — LOSARTAN POTASSIUM SCH MG: 50 TABLET, FILM COATED ORAL at 08:29

## 2021-03-11 NOTE — IPNPDOC
Subjective


Review oF Systems


Chief Complaint


The patient is a 70-year-old male admitted with a reason for visit of Prostate 

Cancer.


Events since Last Encounter


No acute events o/n.  Pain better controlled.  Tolerating diet.  No n/v.  Did 

not ambulate much yesterday.  No f/c/ns.





Objective


Physical Examination


General Exam:  Alert, Cooperative, No Acute Distress


ABDOMEN EXAM:  Soft, Tenderness (mild), Other (incisions clean/dry/intact; ACACIA w/

serous output)


Skin Exam:  Nl turgor and temperature


Neuro Exam:  Normal Speech


Psych Exam:  Mental status NL, Mood NL


Other physical findings


catheter draining wayne colored urine





Vital Signs/I&O





Vital Signs








  Date Time  Temp Pulse Resp B/P (MAP) Pulse Ox O2 Delivery O2 Flow Rate FiO2


 


3/11/21 06:00 97.8 89 17 131/77 (95) 97 Room Air  


 


3/10/21 06:00       3.0 














I&O- Last 24 Hours up to 6 AM 


 


 3/11/21





 06:00


 


Intake Total 3600 ml


 


Output Total 1625 ml


 


Balance 1975 ml











Laboratory Data


Labs 24H


Laboratory Tests 2


3/11/21 06:50: 


Nucleated Red Blood Cells % (auto) 0.0, Anion Gap 6L, Glomerular Filtration Rate

> 60.0, Calcium Level 8.2L


CBC/BMP


Laboratory Tests


3/11/21 06:50











Assessment/Plan


Date Seen


The patient was seen on 3/11/21.





Patient Summary


This is a 69 y/o M POD2 s/p RALP w/ BPLND.  Hb stable.  Cr normal today.  Good 

UOP.  Normal ACACIA output.  Not ambulating enough.





Plan/VTE


VTE Prophylaxis Ordered?:  Yes


VTE Exclusion Mechanical Proph:  N/A:VTE Prophy Ordered


VTE Exclusion Pharmacological:  N/A:VTE Prophy Ordered





Plan/Urinary Catheter


Urinary Catheter:  Other Catheter: (catheter to stay for at least 7 days for 

healing of the vesicourethral anastomosis)





Plan


- percocet prn pain


- cont home meds


- need to increase ambulation


- SCDs while in bed


- incentive spirometry


- strict I/Os


- SQH


- regular diet


- plan discharge home once patient ambulating more











RAMSEY LOMAX MD           Mar 11, 2021 07:59

## 2021-03-12 NOTE — DSES
DISCHARGE SUMMARY



DATE OF ADMISSION:  03/09/2021

DATE OF DISCHARGE:  03/11/2021



ADMISSION DIAGNOSIS:

Prostate cancer.



DISCHARGE DIAGNOSIS:

Prostate cancer.



ADMITTING PHYSICIAN: Dr. Madhu Alexis 



DISCHARGING PHYSICIAN: Dr. Madhu Alexis



PROCEDURES PERFORMED: Robotic-assisted laparoscopic radical prostatectomy,

bilateral pelvic lymph node dissection on 03/09/2021.



HISTORY OF PRESENT ILLNESS: This is a 70-year-old male who underwent the

above-listed procedure on 03/09/2021 and was admitted to the hospital

postoperatively. 



HOSPITALIZATION COURSE: The patient was admitted to the hospital on March 9 
after

undergoing the above-listed procedure. On postoperative day #1, his lab work

was notable for a stable hemoglobin at 13.1. His serum creatinine had come down

to 1.45 from 1.85 immediately postoperatively. He was making good urine output,

and his Alexandre-Montiel drain had normal amounts of output. He did have some

difficulty with incision pain control and ambulating. He therefore was not

deemed ready for discharge home on postoperative day #1. By postoperative day

#2, he started ambulating better with improved pain control. His lab work on

postoperative day #2 was notable for a stable hemoglobin at 12.9, and his serum

creatinine had come down to normal at 1.07. He continued to have normal urine

output and normal output from his Alexandre-Montiel drain. His diet was advanced,

and he tolerated a regular diet. Since he was doing well at this point, he

was deemed ready for discharge home. His Alexandre-Montiel drain was removed prior

to discharge. He was discharged home with his catheter in place with the plan

for him to followup in the urology clinic in approximately 1 week for catheter

removal and to discuss his pathology results.

OBI

## 2021-04-12 ENCOUNTER — HOSPITAL ENCOUNTER (OUTPATIENT)
Dept: HOSPITAL 53 - M PLALAB | Age: 71
End: 2021-04-12
Attending: UROLOGY
Payer: MEDICARE

## 2021-04-12 DIAGNOSIS — C61: Primary | ICD-10-CM

## 2021-05-05 ENCOUNTER — HOSPITAL ENCOUNTER (OUTPATIENT)
Dept: HOSPITAL 53 - M PLALAB | Age: 71
End: 2021-05-05
Attending: UROLOGY
Payer: MEDICARE

## 2021-05-05 DIAGNOSIS — C61: Primary | ICD-10-CM

## 2021-05-27 ENCOUNTER — HOSPITAL ENCOUNTER (OUTPATIENT)
Dept: HOSPITAL 53 - M PLALAB | Age: 71
End: 2021-05-27
Attending: UROLOGY
Payer: MEDICARE

## 2021-05-27 DIAGNOSIS — C61: Primary | ICD-10-CM

## 2021-06-21 ENCOUNTER — HOSPITAL ENCOUNTER (OUTPATIENT)
Dept: HOSPITAL 53 - M PLALAB | Age: 71
End: 2021-06-21
Attending: UROLOGY
Payer: MEDICARE

## 2021-06-21 DIAGNOSIS — C61: Primary | ICD-10-CM

## 2021-07-22 ENCOUNTER — HOSPITAL ENCOUNTER (OUTPATIENT)
Dept: HOSPITAL 53 - M PLALAB | Age: 71
End: 2021-07-22
Attending: UROLOGY
Payer: MEDICARE

## 2021-07-22 DIAGNOSIS — C61: Primary | ICD-10-CM

## 2021-08-10 ENCOUNTER — HOSPITAL ENCOUNTER (OUTPATIENT)
Dept: HOSPITAL 53 - M ONCR | Age: 71
End: 2021-08-10
Attending: RADIOLOGY
Payer: MEDICARE

## 2021-08-10 DIAGNOSIS — C61: Primary | ICD-10-CM

## 2021-08-10 DIAGNOSIS — Z79.899: ICD-10-CM

## 2021-08-10 DIAGNOSIS — Z79.82: ICD-10-CM

## 2021-08-10 DIAGNOSIS — Z80.1: ICD-10-CM

## 2021-08-10 DIAGNOSIS — Z88.1: ICD-10-CM

## 2021-08-10 DIAGNOSIS — I10: ICD-10-CM

## 2021-08-10 DIAGNOSIS — Z87.891: ICD-10-CM

## 2021-08-10 DIAGNOSIS — N52.31: ICD-10-CM

## 2021-08-10 DIAGNOSIS — Z96.1: ICD-10-CM

## 2021-08-10 DIAGNOSIS — N39.3: ICD-10-CM

## 2021-08-10 NOTE — RADONC.CN
Radiation Oncology Hx/Consult


Radiation Oncology Consult


Date of Service:  Aug 10, 2021


Pt Identifier


Cam Elkins is a 70 year old male former smoker with prostate cancer 

[id number] [place] 4+3=7 (4+4=8 on biopsy) post-op PSA to 2.02. He is s/p RALP on 

3/9/21 with Dr. Lomax, unfortunately his PSA never regressed below 1.00. He 

is seen today for consideration of salvage RT.





Diagnosis/Treatment History


Oncologic History


Followed by Dr. Lomax for elevated PSA





21 TRUS biopsy


Colp 4+4=8 in  cores





3/10/21 RALP


B[place] 4+3=7 


[id number]


Margins negative





PSA history








Item Value  Date Time


 


Prostate Specific Antigen Screen 4.02 NG/ML H 20 1125 pre-op


 


Prostate Specific Antigen 1.12 NG/ML 21 1325 post-op


 


Prostate Specific Antigen 1.80 NG/ML 21 1111


 


Prostate Specific Antigen 1.36 NG/ML 21 1255


 


Prostate Specific Antigen 1.92 NG/ML 21 1340


 


Prostate Specific Antigen 2.02 NG/ML 21 1341








IPSS 3


HEIDE 1


Interval History


Cam is here with Maryanne his supportive wife. He reports that he only has 

occasional stress incontinence, wears 1 pad per day, most days no leakage at 

all. He has no bowel complaints. He has complete ED, cannot keep an erection 

post-operatively. He is able to stimulate himself to orgasm despite lack of 

erections. He has stable weight and appetite. No bone pain.


Past Medical History:  


HTN 


Mitral valve prolapse


Past Surgical History:  


Cataract 


Colonoscopy


Tonsillectomy 


Family History:  


Father lung cancer 


Social History:  


60 pack year former smoker, quit 


Drinks socially on occasion





Allergies / Meds


Allergies:  


Coded Allergies:  


     ciprofloxacin (Verified  Allergy, Mild, RASH, 8/10/21)


   given after prostate surg


Home Meds


Active Scripts


Bicalutamide (Bicalutamide) 50 Mg Tablet, 1 TAB PO DAILY for 30 Days, #30 TAB


   Prov:OTILIA PERALTA MD         8/10/21


Reported Medications


Amlodipine Besylate (Norvasc) 5 Mg Tablet, 5 MG PO DAILY, TAB


   21


Aspirin (Aspirin) 81 Mg Tab.chew, 81 MG PO DAILY


   21


Metoprolol Succinate (Metoprolol Succinate) 50 Mg Tab.er.24h, 50 MG PO DAILY


   19


Losartan Potassium (Losartan Potassium) 50 Mg Tablet, 50 MG PO DAILY


   19


Discontinued Scripts


Oxycodone/Acetaminophen (Oxycodone-Acetaminophen 5-325) 1 Each Tablet, 1 TAB PO 

Q4H PRN for MODERATE/SEVERE PAIN (PS 5-10) MDD 6, #30 TAB


   Prov:RAMSEY LOMAX MD         3/11/21


Docusate Sodium (Dok) 100 Mg Capsule, 100 MG PO BID, #20 CAP


   Prov:RAMSEY LOMAX MD         3/11/21


Ciprofloxacin HCl (Cipro) 500 Mg Tablet, 500 MG PO DAILY for 7 Days, #7 TAB


   Prov:RAMSEY LOMAX MD         3/11/21





Review of Systems


Constitutional:  Denies: Fever, Fatigue, Weight Loss


Eyes:  Denies: Pain


HEENT:  Denies: Head Aches


Skin:  Denies: Rash


Pulmonary:  Denies: Dyspnea, Cough


Cardiovascular:  Denies: Chest Pain, Orthopnea


Gastrointestinal:  Denies: Abdominal Pain, Hematochezia


Genitourinary:  Denies: Dysuria, Frequency


Musculoskeletal:  Denies: Neck pain, Back pain


Neurological:  Denies: Weakness, Numbness


Psych:  Reports: Mood Normal





Vital Signs


Wt 214 lbs


T 96.6


P 78


RR 18


/84


O2 98%


Pain 0 


Fatigue 0


General Exam:  Alert, Cooperative, No Acute Distress


Eye Exam:  PERRLA, EOMI


ENT EXAM:  Atraumatic


Neck Exam:  Supple


Chest Exam:  Clear to auscultation


Heart Exam:  Rate Normal, Regular Rhythm


Abdomen Exam:  Soft; 


   Negative: Tenderness


Extremity Exam:  Negative: Edema


Skin Exam:  Nl turgor and temperature


Neuro Exam:  Normal Gait, Normal Speech, Cranial Nerves 3-12 NL


Psych Exam:  Mental status NL


Diagnostic and Laboratory


Diagnostic Review


Radiologic images, relevant labs and pathology reports were personally reviewed 

and discussed with Mr. Elkins.





Assessment and Plan


Impression


Mr. Elkins is a 70 year old male former smoker with prostate cancer 

[id number] [place] 4+3=7 (4+4=8 on biopsy) post-op PSA to 2.02. He is s/p RALP on 

3/9/21 with Dr. Lomax, unfortunately his PSA never regressed below 1.00. He 

is seen today for consideration of salvage RT.


Stage


Prostate cancer [id number] Colp 4+3=7 post-op PSA to 2.02 stage IIC


Performance Status


ECOG 0


Plan


We had an extensive discussion with Mr. Elkins regarding the diagnosis at

hand and available therapeutic options.





He originally had Colp 4+4=8 on biopsy which was discordant with the 

prostatectomy specimen pathology. His PSA never became lower than 1.00 post-

operatively and he had no positive margin. Overall these are adverse pathologic 

features. 





Thankfully his urinary function is well-compensated at this time. 





I recommend salvage RT including the pelvic LN + 6 months of ADT in his case on 

the basis of the SPPORT trial which demonstrate a bPFS advantage for both ADT 

and LN irradiation in men with high post-operative PSA. 





The inclusion of ADT is further validated by the [facility] nomogram which suggests a

10-20% bPFS advantage over RT alone. 





They agreed to proceed.





We discussed the logistics of receiving radiation therapy in detail including 

the need for a 1-time planning session. 





We reviewed the side effects of RT and ADT including irritative voiding 

symptoms, increased bowel frequency, late rectal bleeding, fatigue, hot flashes,

decreased libido, and weight gain.


 


After discussing the risks, benefits and alternatives to radiation therapy, Mr. Elkins was amenable to pursuing radiotherapy. All questions were answered 

to the patient's satisfaction.





I will prescribe casodex and schedule simulation and 6 month lupron injection 

for ~ 2 weeks from now. 





We instructed the patient that if there were any questions,concerns or changes 

in clinical status in the interim to contact us.


Recommendations


Salvage RT 68.4 Gy in 38 fractions + 6 months lupron


Casodex 50 mg daily now


Simulation and lupron injection in 2 weeks


Billing Statement


Total time of [45] minutes was spent preparing for the visit [3], obtaining HPI 

[7], examining the patient [1], reviewing diagnostic tests [5], discussing 

management options [19], coordinating care [3], and writing this note [7].











OTILIA PERALTA MD               Aug 10, 2021 15:28

## 2021-08-24 ENCOUNTER — HOSPITAL ENCOUNTER (OUTPATIENT)
Dept: HOSPITAL 53 - M ONCR | Age: 71
LOS: 7 days | End: 2021-08-31
Attending: RADIOLOGY
Payer: MEDICARE

## 2021-08-24 DIAGNOSIS — C61: Primary | ICD-10-CM

## 2021-08-24 PROCEDURE — 77338 DESIGN MLC DEVICE FOR IMRT: CPT

## 2021-08-24 PROCEDURE — 77301 RADIOTHERAPY DOSE PLAN IMRT: CPT

## 2021-08-24 PROCEDURE — 77334 RADIATION TREATMENT AID(S): CPT

## 2021-08-24 PROCEDURE — 77300 RADIATION THERAPY DOSE PLAN: CPT

## 2021-08-24 RX ADMIN — LEUPROLIDE ACETATE ONE MG: KIT at 14:35

## 2021-09-30 ENCOUNTER — HOSPITAL ENCOUNTER (OUTPATIENT)
Dept: HOSPITAL 53 - M ONCR | Age: 71
End: 2021-09-30
Attending: RADIOLOGY
Payer: MEDICARE

## 2021-09-30 DIAGNOSIS — C61: Primary | ICD-10-CM

## 2021-10-29 ENCOUNTER — HOSPITAL ENCOUNTER (OUTPATIENT)
Dept: HOSPITAL 53 - M ONCR | Age: 71
LOS: 2 days | End: 2021-10-31
Attending: RADIOLOGY
Payer: MEDICARE

## 2021-10-29 DIAGNOSIS — R19.7: ICD-10-CM

## 2021-10-29 DIAGNOSIS — R35.1: ICD-10-CM

## 2021-10-29 DIAGNOSIS — C61: Primary | ICD-10-CM

## 2021-11-04 ENCOUNTER — HOSPITAL ENCOUNTER (OUTPATIENT)
Dept: HOSPITAL 53 - M ONCR | Age: 71
LOS: 26 days | End: 2021-11-30
Attending: RADIOLOGY
Payer: MEDICARE

## 2021-11-04 DIAGNOSIS — C61: Primary | ICD-10-CM

## 2021-11-29 ENCOUNTER — HOSPITAL ENCOUNTER (OUTPATIENT)
Dept: HOSPITAL 53 - M LAB | Age: 71
End: 2021-11-29
Attending: UROLOGY
Payer: MEDICARE

## 2021-11-29 DIAGNOSIS — C61: Primary | ICD-10-CM

## 2022-02-07 ENCOUNTER — HOSPITAL ENCOUNTER (OUTPATIENT)
Dept: HOSPITAL 53 - M ONCR | Age: 72
End: 2022-02-07
Attending: RADIOLOGY
Payer: MEDICARE

## 2022-02-07 DIAGNOSIS — Z88.1: ICD-10-CM

## 2022-02-07 DIAGNOSIS — C61: Primary | ICD-10-CM

## 2022-02-07 DIAGNOSIS — Z87.891: ICD-10-CM

## 2022-02-07 DIAGNOSIS — Z92.3: ICD-10-CM

## 2022-02-07 LAB
PSA SERPL-MCNC: < 0.01 NG/ML (ref ?–4)
TESTOST SERPL-MCNC: 10 NG/DL (ref 241–827)

## 2022-02-09 ENCOUNTER — HOSPITAL ENCOUNTER (OUTPATIENT)
Dept: HOSPITAL 53 - M ONCR | Age: 72
End: 2022-02-09
Attending: RADIOLOGY
Payer: MEDICARE

## 2022-02-09 DIAGNOSIS — Z88.1: ICD-10-CM

## 2022-02-09 DIAGNOSIS — Z92.3: ICD-10-CM

## 2022-02-09 DIAGNOSIS — Z87.891: ICD-10-CM

## 2022-02-09 DIAGNOSIS — C61: Primary | ICD-10-CM

## 2022-02-28 ENCOUNTER — HOSPITAL ENCOUNTER (OUTPATIENT)
Dept: HOSPITAL 53 - M PLALAB | Age: 72
End: 2022-02-28
Attending: UROLOGY
Payer: MEDICARE

## 2022-02-28 DIAGNOSIS — C61: Primary | ICD-10-CM

## 2022-06-07 ENCOUNTER — HOSPITAL ENCOUNTER (OUTPATIENT)
Dept: HOSPITAL 53 - M PLALAB | Age: 72
End: 2022-06-07
Attending: UROLOGY
Payer: MEDICARE

## 2022-06-07 DIAGNOSIS — C61: Primary | ICD-10-CM

## 2022-08-29 ENCOUNTER — HOSPITAL ENCOUNTER (OUTPATIENT)
Dept: HOSPITAL 53 - M PLALAB | Age: 72
End: 2022-08-29
Attending: UROLOGY
Payer: MEDICARE

## 2022-08-29 DIAGNOSIS — C61: Primary | ICD-10-CM

## 2022-09-22 ENCOUNTER — HOSPITAL ENCOUNTER (OUTPATIENT)
Dept: HOSPITAL 53 - M SMT | Age: 72
End: 2022-09-22
Attending: UROLOGY
Payer: MEDICARE

## 2022-09-22 DIAGNOSIS — N39.0: Primary | ICD-10-CM

## 2022-09-22 LAB
APPEARANCE UR: CLEAR
BILIRUB UR QL STRIP: NEGATIVE
COLOR UR: YELLOW
GLUCOSE UR STRIP-MCNC: (no result) MG/DL
HGB UR QL STRIP: NEGATIVE
KETONES UR QL STRIP: NEGATIVE MG/DL
LEUKOCYTE ESTERASE UR QL STRIP: NEGATIVE
NITRITE UR QL STRIP: NEGATIVE
PH UR STRIP: 5 UNITS (ref 5–7)
PROT UR STRIP-MCNC: NEGATIVE MG/DL
SP GR UR STRIP: 1.03 (ref 1–1.03)
UROBILINOGEN UR QL STRIP: NORMAL MG/DL

## 2022-10-19 ENCOUNTER — HOSPITAL ENCOUNTER (OUTPATIENT)
Dept: HOSPITAL 53 - M PLALAB | Age: 72
End: 2022-10-19
Attending: NURSE PRACTITIONER
Payer: MEDICARE

## 2022-10-19 DIAGNOSIS — Z00.00: Primary | ICD-10-CM

## 2022-10-19 DIAGNOSIS — R73.01: ICD-10-CM

## 2022-10-19 DIAGNOSIS — Z13.29: ICD-10-CM

## 2022-10-19 LAB
ALBUMIN SERPL BCG-MCNC: 3.5 GM/DL (ref 3.2–5.2)
ALT SERPL W P-5'-P-CCNC: 36 U/L (ref 12–78)
BILIRUB SERPL-MCNC: 0.4 MG/DL (ref 0.2–1)
BUN SERPL-MCNC: 16 MG/DL (ref 7–18)
CALCIUM SERPL-MCNC: 8.9 MG/DL (ref 8.8–10.2)
CHLORIDE SERPL-SCNC: 105 MEQ/L (ref 98–107)
CO2 SERPL-SCNC: 29 MEQ/L (ref 21–32)
CREAT SERPL-MCNC: 1.11 MG/DL (ref 0.7–1.3)
EST. AVERAGE GLUCOSE BLD GHB EST-MCNC: 166 MG/DL (ref 60–110)
GFR SERPL CREATININE-BSD FRML MDRD: > 60 ML/MIN/{1.73_M2} (ref 42–?)
GLUCOSE SERPL-MCNC: 166 MG/DL (ref 70–100)
HCT VFR BLD AUTO: 43.6 % (ref 42–52)
HGB BLD-MCNC: 14.3 G/DL (ref 13.5–17.5)
MCH RBC QN AUTO: 29.8 PG (ref 27–33)
MCHC RBC AUTO-ENTMCNC: 32.8 G/DL (ref 32–36.5)
MCV RBC AUTO: 90.8 FL (ref 80–96)
PLATELET # BLD AUTO: 246 10^3/UL (ref 150–450)
POTASSIUM SERPL-SCNC: 4.7 MEQ/L (ref 3.5–5.1)
PROT SERPL-MCNC: 6.7 GM/DL (ref 6.4–8.2)
RBC # BLD AUTO: 4.8 10^6/UL (ref 4.3–6.1)
SODIUM SERPL-SCNC: 139 MEQ/L (ref 136–145)
TSH SERPL DL<=0.005 MIU/L-ACNC: 1.26 UIU/ML (ref 0.36–3.74)
WBC # BLD AUTO: 5.9 10^3/UL (ref 4–10)

## 2022-10-31 ENCOUNTER — HOSPITAL ENCOUNTER (OUTPATIENT)
Dept: HOSPITAL 53 - M SMT | Age: 72
End: 2022-10-31
Attending: UROLOGY
Payer: MEDICARE

## 2022-10-31 DIAGNOSIS — R30.0: Primary | ICD-10-CM

## 2022-10-31 LAB
AMORPH SED URNS QL MICRO: (no result)
APPEARANCE UR: (no result)
BACTERIA URNS QL MICRO: (no result)
BILIRUB UR QL STRIP: NEGATIVE
COLOR UR: YELLOW
GLUCOSE UR STRIP-MCNC: (no result) MG/DL
HGB UR QL STRIP: NEGATIVE
HYALINE CASTS URNS QL MICRO: (no result) /LPF (ref 0–1)
KETONES UR QL STRIP: NEGATIVE MG/DL
LEUKOCYTE ESTERASE UR QL STRIP: NEGATIVE
NITRITE UR QL STRIP: NEGATIVE
PH UR STRIP: 5 UNITS (ref 5–7)
PROT UR STRIP-MCNC: NEGATIVE MG/DL
RBC #/AREA URNS HPF: (no result) /HPF (ref 0–3)
SP GR UR STRIP: 1.02 (ref 1–1.03)
SQUAMOUS URNS QL MICRO: (no result) /HPF
UROBILINOGEN UR QL STRIP: NORMAL MG/DL
WBC #/AREA URNS HPF: (no result) /HPF (ref 0–3)

## 2022-12-08 ENCOUNTER — HOSPITAL ENCOUNTER (OUTPATIENT)
Dept: HOSPITAL 53 - M PLALAB | Age: 72
End: 2022-12-08
Attending: NURSE PRACTITIONER
Payer: MEDICARE

## 2022-12-08 DIAGNOSIS — R06.02: ICD-10-CM

## 2022-12-08 DIAGNOSIS — E11.9: ICD-10-CM

## 2022-12-08 DIAGNOSIS — I10: ICD-10-CM

## 2022-12-08 DIAGNOSIS — I44.7: Primary | ICD-10-CM

## 2022-12-08 LAB
CHOLEST SERPL-MCNC: 166 MG/DL (ref ?–200)
CHOLEST/HDLC SERPL: 3.39 {RATIO} (ref ?–5)
HDLC SERPL-MCNC: 48.9 MG/DL (ref 40–?)
LDLC SERPL CALC-MCNC: 93.1 MG/DL (ref ?–100)
NONHDLC SERPL-MCNC: 117 MG/DL
TRIGL SERPL-MCNC: 120 MG/DL (ref ?–150)

## 2023-02-09 ENCOUNTER — HOSPITAL ENCOUNTER (OUTPATIENT)
Dept: HOSPITAL 53 - M ONCR | Age: 73
End: 2023-02-09
Attending: RADIOLOGY
Payer: MEDICARE

## 2023-02-09 DIAGNOSIS — Z92.3: ICD-10-CM

## 2023-02-09 DIAGNOSIS — Z79.84: ICD-10-CM

## 2023-02-09 DIAGNOSIS — C61: Primary | ICD-10-CM

## 2023-02-09 DIAGNOSIS — Z87.891: ICD-10-CM

## 2023-02-09 DIAGNOSIS — Z79.82: ICD-10-CM

## 2023-02-09 DIAGNOSIS — Z79.899: ICD-10-CM

## 2023-02-09 DIAGNOSIS — Z79.818: ICD-10-CM

## 2023-02-09 PROCEDURE — 36415 COLL VENOUS BLD VENIPUNCTURE: CPT

## 2023-02-09 PROCEDURE — 84153 ASSAY OF PSA TOTAL: CPT

## 2023-02-17 ENCOUNTER — HOSPITAL ENCOUNTER (OUTPATIENT)
Dept: HOSPITAL 53 - M SMT | Age: 73
End: 2023-02-17
Attending: UROLOGY
Payer: MEDICARE

## 2023-02-17 DIAGNOSIS — R30.0: Primary | ICD-10-CM

## 2023-02-17 LAB
APPEARANCE UR: CLEAR
BACTERIA UR QL AUTO: NEGATIVE
BILIRUB UR QL STRIP.AUTO: NEGATIVE
GLUCOSE UR QL STRIP.AUTO: (no result) MG/DL
HGB UR QL STRIP.AUTO: NEGATIVE
KETONES UR QL STRIP.AUTO: (no result) MG/DL
LEUKOCYTE ESTERASE UR QL STRIP.AUTO: NEGATIVE
MUCOUS THREADS URNS QL MICRO: (no result)
NITRITE UR QL STRIP.AUTO: NEGATIVE
PH UR STRIP.AUTO: 5 UNITS (ref 5–9)
PROT UR QL STRIP.AUTO: NEGATIVE MG/DL
RBC # UR AUTO: 0 /HPF (ref 0–3)
SP GR UR STRIP.AUTO: 1.03 (ref 1–1.03)
SQUAMOUS #/AREA URNS AUTO: 0 /HPF (ref 0–6)
UROBILINOGEN UR QL STRIP.AUTO: 0.2 MG/DL (ref 0–2)
WBC #/AREA URNS AUTO: 1 /HPF (ref 0–3)

## 2023-03-07 ENCOUNTER — HOSPITAL ENCOUNTER (OUTPATIENT)
Dept: HOSPITAL 53 - M SMT | Age: 73
End: 2023-03-07
Attending: UROLOGY
Payer: MEDICARE

## 2023-03-07 DIAGNOSIS — N39.0: Primary | ICD-10-CM

## 2023-03-07 LAB
APPEARANCE UR: CLEAR
BACTERIA UR QL AUTO: NEGATIVE
BILIRUB UR QL STRIP.AUTO: NEGATIVE
CAOX CRY URNS QL MICRO: (no result)
GLUCOSE UR QL STRIP.AUTO: (no result) MG/DL
HGB UR QL STRIP.AUTO: NEGATIVE
KETONES UR QL STRIP.AUTO: (no result) MG/DL
LEUKOCYTE ESTERASE UR QL STRIP.AUTO: NEGATIVE
MUCOUS THREADS URNS QL MICRO: (no result)
NITRITE UR QL STRIP.AUTO: NEGATIVE
PH UR STRIP.AUTO: 5 UNITS (ref 5–9)
PROT UR QL STRIP.AUTO: NEGATIVE MG/DL
RBC # UR AUTO: 0 /HPF (ref 0–3)
SP GR UR STRIP.AUTO: 1.03 (ref 1–1.03)
SQUAMOUS #/AREA URNS AUTO: 0 /HPF (ref 0–6)
UROBILINOGEN UR QL STRIP.AUTO: 0.2 MG/DL (ref 0–2)
WBC #/AREA URNS AUTO: 1 /HPF (ref 0–3)

## 2023-08-15 ENCOUNTER — HOSPITAL ENCOUNTER (OUTPATIENT)
Dept: HOSPITAL 53 - M PLALAB | Age: 73
End: 2023-08-15
Attending: UROLOGY
Payer: MEDICARE

## 2023-08-15 DIAGNOSIS — C61: Primary | ICD-10-CM

## 2024-01-05 ENCOUNTER — HOSPITAL ENCOUNTER (OUTPATIENT)
Dept: HOSPITAL 53 - M SMT | Age: 74
End: 2024-01-05
Attending: UROLOGY
Payer: MEDICARE

## 2024-01-05 DIAGNOSIS — N39.0: Primary | ICD-10-CM

## 2024-01-05 LAB
APPEARANCE UR: CLEAR
BACTERIA UR QL AUTO: NEGATIVE
BILIRUB UR QL STRIP.AUTO: NEGATIVE
GLUCOSE UR QL STRIP.AUTO: (no result) MG/DL
HGB UR QL STRIP.AUTO: NEGATIVE
KETONES UR QL STRIP.AUTO: NEGATIVE MG/DL
LEUKOCYTE ESTERASE UR QL STRIP.AUTO: NEGATIVE
NITRITE UR QL STRIP.AUTO: NEGATIVE
PH UR STRIP.AUTO: 5 UNITS (ref 5–9)
PROT UR QL STRIP.AUTO: (no result) MG/DL
RBC # UR AUTO: 2 /HPF (ref 0–3)
SP GR UR STRIP.AUTO: 1.03 (ref 1–1.03)
SQUAMOUS #/AREA URNS AUTO: 0 /HPF (ref 0–6)
UROBILINOGEN UR QL STRIP.AUTO: 0.2 MG/DL (ref 0–2)
WBC #/AREA URNS AUTO: 1 /HPF (ref 0–3)

## 2024-02-09 ENCOUNTER — HOSPITAL ENCOUNTER (OUTPATIENT)
Dept: HOSPITAL 53 - M ONCR | Age: 74
End: 2024-02-09
Attending: RADIOLOGY
Payer: MEDICARE

## 2024-02-09 DIAGNOSIS — N39.3: ICD-10-CM

## 2024-02-09 DIAGNOSIS — R19.7: ICD-10-CM

## 2024-02-09 DIAGNOSIS — Z92.3: ICD-10-CM

## 2024-02-09 DIAGNOSIS — C61: Primary | ICD-10-CM

## 2024-02-09 DIAGNOSIS — Z79.899: ICD-10-CM

## 2024-02-09 DIAGNOSIS — Z87.891: ICD-10-CM

## 2024-02-09 DIAGNOSIS — Z79.82: ICD-10-CM

## 2024-02-09 DIAGNOSIS — Z92.29: ICD-10-CM

## 2024-02-09 DIAGNOSIS — Z71.2: ICD-10-CM

## 2024-02-09 DIAGNOSIS — Z90.79: ICD-10-CM

## 2024-02-09 DIAGNOSIS — Z88.1: ICD-10-CM

## 2024-02-09 DIAGNOSIS — N32.89: ICD-10-CM

## 2024-02-09 DIAGNOSIS — R30.0: ICD-10-CM

## 2024-04-19 ENCOUNTER — HOSPITAL ENCOUNTER (OUTPATIENT)
Dept: HOSPITAL 53 - M PLALAB | Age: 74
End: 2024-04-19
Attending: NURSE PRACTITIONER
Payer: MEDICARE

## 2024-04-19 DIAGNOSIS — Z13.29: ICD-10-CM

## 2024-04-19 DIAGNOSIS — E11.9: ICD-10-CM

## 2024-04-19 DIAGNOSIS — I10: Primary | ICD-10-CM

## 2024-04-19 LAB
ALBUMIN SERPL BCG-MCNC: 3.5 G/DL (ref 3.2–5.2)
ALP SERPL-CCNC: 69 U/L (ref 46–116)
ALT SERPL W P-5'-P-CCNC: 31 U/L (ref 7–40)
AST SERPL-CCNC: 15 U/L (ref ?–34)
BILIRUB SERPL-MCNC: 0.5 MG/DL (ref 0.3–1.2)
BUN SERPL-MCNC: 16 MG/DL (ref 9–23)
CALCIUM SERPL-MCNC: 9.3 MG/DL (ref 8.3–10.6)
CHLORIDE SERPL-SCNC: 105 MMOL/L (ref 98–107)
CO2 SERPL-SCNC: 27 MMOL/L (ref 20–31)
CREAT SERPL-MCNC: 1.09 MG/DL (ref 0.7–1.3)
CREAT UR-MCNC: 171.3 MG/DL
EST. AVERAGE GLUCOSE BLD GHB EST-MCNC: 263 MG/DL (ref 60–110)
GFR SERPL CREATININE-BSD FRML MDRD: > 60 ML/MIN/{1.73_M2} (ref 42–?)
GLUCOSE SERPL-MCNC: 296 MG/DL (ref 74–106)
HCT VFR BLD AUTO: 46.1 % (ref 42–52)
HGB BLD-MCNC: 15 G/DL (ref 13.5–17.5)
MCH RBC QN AUTO: 29.1 PG (ref 27–33)
MCHC RBC AUTO-ENTMCNC: 32.5 G/DL (ref 32–36.5)
MCV RBC AUTO: 89.5 FL (ref 80–96)
MICROALBUMIN UR-MCNC: 78 MG/L
MICROALBUMIN/CREAT UR: 45.5 MCG/MG (ref 0–30)
PLATELET # BLD AUTO: 218 10^3/UL (ref 150–450)
POTASSIUM SERPL-SCNC: 4.4 MMOL/L (ref 3.5–5.1)
PROT SERPL-MCNC: 6.7 G/DL (ref 5.7–8.2)
RBC # BLD AUTO: 5.15 10^6/UL (ref 4.3–6.1)
SODIUM SERPL-SCNC: 135 MMOL/L (ref 136–145)
TSH SERPL DL<=0.005 MIU/L-ACNC: 1.28 UIU/ML (ref 0.55–4.78)
WBC # BLD AUTO: 5.3 10^3/UL (ref 4–10)

## 2024-06-06 ENCOUNTER — HOSPITAL ENCOUNTER (OUTPATIENT)
Dept: HOSPITAL 53 - M PLALAB | Age: 74
End: 2024-06-06
Attending: NURSE PRACTITIONER
Payer: MEDICARE

## 2024-06-06 DIAGNOSIS — I10: ICD-10-CM

## 2024-06-06 DIAGNOSIS — E11.9: Primary | ICD-10-CM

## 2024-06-06 LAB
ALBUMIN SERPL BCG-MCNC: 3.7 G/DL (ref 3.2–5.2)
ALP SERPL-CCNC: 57 U/L (ref 46–116)
ALT SERPL W P-5'-P-CCNC: 30 U/L (ref 7–40)
AST SERPL-CCNC: 19 U/L (ref ?–34)
BILIRUB SERPL-MCNC: 0.4 MG/DL (ref 0.3–1.2)
BUN SERPL-MCNC: 20 MG/DL (ref 9–23)
CALCIUM SERPL-MCNC: 9.1 MG/DL (ref 8.3–10.6)
CHLORIDE SERPL-SCNC: 108 MMOL/L (ref 98–107)
CO2 SERPL-SCNC: 24 MMOL/L (ref 20–31)
CREAT SERPL-MCNC: 1.11 MG/DL (ref 0.7–1.3)
EST. AVERAGE GLUCOSE BLD GHB EST-MCNC: 206 MG/DL (ref 60–110)
GFR SERPL CREATININE-BSD FRML MDRD: > 60 ML/MIN/{1.73_M2} (ref 42–?)
GLUCOSE SERPL-MCNC: 148 MG/DL (ref 74–106)
POTASSIUM SERPL-SCNC: 5 MMOL/L (ref 3.5–5.1)
PROT SERPL-MCNC: 6.8 G/DL (ref 5.7–8.2)
SODIUM SERPL-SCNC: 139 MMOL/L (ref 136–145)

## 2024-07-30 ENCOUNTER — HOSPITAL ENCOUNTER (OUTPATIENT)
Dept: HOSPITAL 53 - M PLALAB | Age: 74
End: 2024-07-30
Attending: NURSE PRACTITIONER
Payer: MEDICARE

## 2024-07-30 DIAGNOSIS — I10: Primary | ICD-10-CM

## 2024-07-30 DIAGNOSIS — E11.9: ICD-10-CM

## 2024-07-30 LAB
ALBUMIN SERPL BCG-MCNC: 4 G/DL (ref 3.2–5.2)
ALP SERPL-CCNC: 64 U/L (ref 46–116)
ALT SERPL W P-5'-P-CCNC: 29 U/L (ref 7–40)
AST SERPL-CCNC: 13 U/L (ref ?–34)
BILIRUB SERPL-MCNC: 0.6 MG/DL (ref 0.3–1.2)
BUN SERPL-MCNC: 23 MG/DL (ref 9–23)
CALCIUM SERPL-MCNC: 9.3 MG/DL (ref 8.3–10.6)
CHLORIDE SERPL-SCNC: 104 MMOL/L (ref 98–107)
CHOLEST SERPL-MCNC: 161 MG/DL (ref ?–200)
CHOLEST/HDLC SERPL: 4.09 {RATIO} (ref ?–5)
CO2 SERPL-SCNC: 26 MMOL/L (ref 20–31)
CREAT SERPL-MCNC: 1.57 MG/DL (ref 0.7–1.3)
EST. AVERAGE GLUCOSE BLD GHB EST-MCNC: 137 MG/DL (ref 60–110)
GFR SERPL CREATININE-BSD FRML MDRD: 46.3 ML/MIN/{1.73_M2} (ref 42–?)
GLUCOSE SERPL-MCNC: 88 MG/DL (ref 74–106)
HDLC SERPL-MCNC: 39.3 MG/DL (ref 40–?)
LDLC SERPL CALC-MCNC: 90.7 MG/DL (ref ?–100)
NONHDLC SERPL-MCNC: 121.7 MG/DL
POTASSIUM SERPL-SCNC: 4.3 MMOL/L (ref 3.5–5.1)
PROT SERPL-MCNC: 7 G/DL (ref 5.7–8.2)
SODIUM SERPL-SCNC: 139 MMOL/L (ref 136–145)
TRIGL SERPL-MCNC: 155 MG/DL (ref ?–150)

## 2024-08-05 ENCOUNTER — HOSPITAL ENCOUNTER (OUTPATIENT)
Dept: HOSPITAL 53 - M OPP | Age: 74
Discharge: HOME | End: 2024-08-05
Attending: INTERNAL MEDICINE
Payer: MEDICARE

## 2024-08-05 VITALS — OXYGEN SATURATION: 96 % | SYSTOLIC BLOOD PRESSURE: 125 MMHG | DIASTOLIC BLOOD PRESSURE: 74 MMHG

## 2024-08-05 VITALS — WEIGHT: 218 LBS | HEIGHT: 69 IN | BODY MASS INDEX: 32.29 KG/M2

## 2024-08-05 VITALS — TEMPERATURE: 98.6 F

## 2024-08-05 DIAGNOSIS — Z86.010: Primary | ICD-10-CM

## 2024-08-05 DIAGNOSIS — Z79.82: ICD-10-CM

## 2024-08-05 DIAGNOSIS — D12.4: ICD-10-CM

## 2024-08-05 DIAGNOSIS — I34.9: ICD-10-CM

## 2024-08-05 DIAGNOSIS — K57.30: ICD-10-CM

## 2024-08-05 DIAGNOSIS — Z79.899: ICD-10-CM

## 2024-08-05 DIAGNOSIS — K64.8: ICD-10-CM

## 2024-08-05 DIAGNOSIS — Z79.84: ICD-10-CM

## 2024-08-05 DIAGNOSIS — Z88.1: ICD-10-CM

## 2024-08-05 DIAGNOSIS — E11.9: ICD-10-CM

## 2024-08-15 ENCOUNTER — HOSPITAL ENCOUNTER (OUTPATIENT)
Dept: HOSPITAL 53 - M PLALAB | Age: 74
End: 2024-08-15
Attending: UROLOGY
Payer: MEDICARE

## 2024-08-15 DIAGNOSIS — C61: Primary | ICD-10-CM

## 2024-08-15 DIAGNOSIS — R30.0: ICD-10-CM

## 2024-08-15 LAB
APPEARANCE UR: (no result)
BACTERIA UR QL AUTO: NEGATIVE
BILIRUB UR QL STRIP.AUTO: NEGATIVE
GLUCOSE UR QL STRIP.AUTO: NEGATIVE MG/DL
HGB UR QL STRIP.AUTO: NEGATIVE
KETONES UR QL STRIP.AUTO: NEGATIVE MG/DL
LEUKOCYTE ESTERASE UR QL STRIP.AUTO: NEGATIVE
MUCOUS THREADS URNS QL MICRO: (no result)
NITRITE UR QL STRIP.AUTO: NEGATIVE
PH UR STRIP.AUTO: 5 UNITS (ref 5–9)
PROT UR QL STRIP.AUTO: (no result) MG/DL
RBC # UR AUTO: 0 /HPF (ref 0–3)
SP GR UR STRIP.AUTO: 1.02 (ref 1–1.03)
SQUAMOUS #/AREA URNS AUTO: 0 /HPF (ref 0–6)
UROBILINOGEN UR QL STRIP.AUTO: 0.2 MG/DL (ref 0–2)
WBC #/AREA URNS AUTO: 1 /HPF (ref 0–3)

## 2024-10-16 ENCOUNTER — HOSPITAL ENCOUNTER (OUTPATIENT)
Dept: HOSPITAL 53 - M PLALAB | Age: 74
End: 2024-10-16
Attending: NURSE PRACTITIONER
Payer: MEDICARE

## 2024-10-16 DIAGNOSIS — K76.0: ICD-10-CM

## 2024-10-16 DIAGNOSIS — E11.9: Primary | ICD-10-CM

## 2024-10-16 LAB
ALBUMIN SERPL BCG-MCNC: 3.9 G/DL (ref 3.2–5.2)
ALP SERPL-CCNC: 68 U/L (ref 46–116)
ALT SERPL W P-5'-P-CCNC: 25 U/L (ref 7–40)
AST SERPL-CCNC: 12 U/L (ref ?–34)
BILIRUB SERPL-MCNC: 0.5 MG/DL (ref 0.3–1.2)
BUN SERPL-MCNC: 22 MG/DL (ref 9–23)
CALCIUM SERPL-MCNC: 9.5 MG/DL (ref 8.3–10.6)
CHLORIDE SERPL-SCNC: 106 MMOL/L (ref 98–107)
CHOLEST SERPL-MCNC: 132 MG/DL (ref ?–200)
CHOLEST/HDLC SERPL: 3.12 {RATIO} (ref ?–5)
CO2 SERPL-SCNC: 28 MMOL/L (ref 20–31)
CREAT SERPL-MCNC: 1.33 MG/DL (ref 0.7–1.3)
CREAT UR-MCNC: 142.2 MG/DL
EST. AVERAGE GLUCOSE BLD GHB EST-MCNC: 120 MG/DL (ref 60–110)
GFR SERPL CREATININE-BSD FRML MDRD: 56 ML/MIN/{1.73_M2} (ref 42–?)
GLUCOSE SERPL-MCNC: 99 MG/DL (ref 74–106)
HCT VFR BLD AUTO: 49.5 % (ref 42–52)
HDLC SERPL-MCNC: 42.2 MG/DL (ref 40–?)
HGB BLD-MCNC: 15.9 G/DL (ref 13.5–17.5)
LDLC SERPL CALC-MCNC: 67 MG/DL (ref ?–100)
MCH RBC QN AUTO: 29.2 PG (ref 27–33)
MCHC RBC AUTO-ENTMCNC: 32.1 G/DL (ref 32–36.5)
MCV RBC AUTO: 91 FL (ref 80–96)
MICROALBUMIN UR-MCNC: 39 MG/L
MICROALBUMIN/CREAT UR: 27.4 MCG/MG (ref 0–30)
NONHDLC SERPL-MCNC: 89.8 MG/DL
PLATELET # BLD AUTO: 234 10^3/UL (ref 150–450)
POTASSIUM SERPL-SCNC: 4.7 MMOL/L (ref 3.5–5.1)
PROT SERPL-MCNC: 7.2 G/DL (ref 5.7–8.2)
RBC # BLD AUTO: 5.44 10^6/UL (ref 4.3–6.1)
SODIUM SERPL-SCNC: 140 MMOL/L (ref 136–145)
TRIGL SERPL-MCNC: 114 MG/DL (ref ?–150)
WBC # BLD AUTO: 7.4 10^3/UL (ref 4–10)

## 2025-02-10 ENCOUNTER — HOSPITAL ENCOUNTER (OUTPATIENT)
Dept: HOSPITAL 53 - M PLALAB | Age: 75
End: 2025-02-10
Attending: RADIOLOGY
Payer: MEDICARE

## 2025-02-10 DIAGNOSIS — C61: Primary | ICD-10-CM

## 2025-02-11 ENCOUNTER — HOSPITAL ENCOUNTER (OUTPATIENT)
Dept: HOSPITAL 53 - M ONCR | Age: 75
End: 2025-02-11
Attending: RADIOLOGY
Payer: MEDICARE

## 2025-02-11 DIAGNOSIS — Z87.891: ICD-10-CM

## 2025-02-11 DIAGNOSIS — Z79.84: ICD-10-CM

## 2025-02-11 DIAGNOSIS — Z79.82: ICD-10-CM

## 2025-02-11 DIAGNOSIS — C61: Primary | ICD-10-CM

## 2025-02-11 DIAGNOSIS — Z88.1: ICD-10-CM

## 2025-02-11 DIAGNOSIS — Z79.899: ICD-10-CM

## 2025-02-11 DIAGNOSIS — Z92.3: ICD-10-CM

## 2025-02-11 DIAGNOSIS — Z90.79: ICD-10-CM

## 2025-03-31 ENCOUNTER — HOSPITAL ENCOUNTER (OUTPATIENT)
Dept: HOSPITAL 53 - M PLALAB | Age: 75
End: 2025-03-31
Attending: NURSE PRACTITIONER
Payer: MEDICARE

## 2025-03-31 DIAGNOSIS — I10: Primary | ICD-10-CM

## 2025-03-31 DIAGNOSIS — K76.0: ICD-10-CM

## 2025-03-31 DIAGNOSIS — E11.9: ICD-10-CM

## 2025-03-31 LAB
ALBUMIN SERPL BCG-MCNC: 3.7 G/DL (ref 3.2–5.2)
ALP SERPL-CCNC: 64 U/L (ref 40–129)
ALT SERPL W P-5'-P-CCNC: 29 U/L (ref 7–40)
AST SERPL-CCNC: 15 U/L (ref ?–34)
BILIRUB SERPL-MCNC: 0.6 MG/DL (ref 0.3–1.2)
BUN SERPL-MCNC: 20 MG/DL (ref 9–23)
CALCIUM SERPL-MCNC: 8.9 MG/DL (ref 8.3–10.6)
CHLORIDE SERPL-SCNC: 106 MMOL/L (ref 98–107)
CHOLEST SERPL-MCNC: 120 MG/DL (ref ?–200)
CHOLEST/HDLC SERPL: 2.69 {RATIO} (ref ?–5)
CO2 SERPL-SCNC: 29 MMOL/L (ref 20–31)
CREAT SERPL-MCNC: 1.08 MG/DL (ref 0.7–1.3)
CREAT UR-MCNC: 90 MG/DL
EST. AVERAGE GLUCOSE BLD GHB EST-MCNC: 131 MG/DL (ref 60–110)
GFR SERPL CREATININE-BSD FRML MDRD: > 60 ML/MIN/{1.73_M2} (ref 42–?)
GLUCOSE SERPL-MCNC: 148 MG/DL (ref 74–106)
HCT VFR BLD AUTO: 49.6 % (ref 42–52)
HDLC SERPL-MCNC: 44.5 MG/DL (ref 40–?)
HGB BLD-MCNC: 16.2 G/DL (ref 13.5–17.5)
LDLC SERPL CALC-MCNC: 58.9 MG/DL (ref ?–100)
MAGNESIUM SERPL-MCNC: 2.1 MG/DL (ref 1.8–2.4)
MCH RBC QN AUTO: 29.4 PG (ref 27–33)
MCHC RBC AUTO-ENTMCNC: 32.7 G/DL (ref 32–36.5)
MCV RBC AUTO: 90 FL (ref 80–96)
MICROALBUMIN UR-MCNC: 36 MG/L
MICROALBUMIN/CREAT UR: 40 MCG/MG (ref 0–30)
NONHDLC SERPL-MCNC: 75.5 MG/DL
PLATELET # BLD AUTO: 212 10^3/UL (ref 150–450)
POTASSIUM SERPL-SCNC: 4.6 MMOL/L (ref 3.5–5.1)
PROT SERPL-MCNC: 7 G/DL (ref 5.7–8.2)
RBC # BLD AUTO: 5.51 10^6/UL (ref 4.3–6.1)
SODIUM SERPL-SCNC: 142 MMOL/L (ref 136–145)
TRIGL SERPL-MCNC: 83 MG/DL (ref ?–150)
WBC # BLD AUTO: 6.2 10^3/UL (ref 4–10)